# Patient Record
Sex: MALE | Race: WHITE | URBAN - METROPOLITAN AREA
[De-identification: names, ages, dates, MRNs, and addresses within clinical notes are randomized per-mention and may not be internally consistent; named-entity substitution may affect disease eponyms.]

---

## 2017-09-27 ENCOUNTER — TELEPHONE (OUTPATIENT)
Dept: ADMINISTRATIVE | Age: 82
End: 2017-09-27

## 2017-09-29 ENCOUNTER — TELEPHONE (OUTPATIENT)
Dept: ADMINISTRATIVE | Age: 82
End: 2017-09-29

## 2019-02-06 PROBLEM — I51.7 ATRIAL DILATATION, BILATERAL: Status: ACTIVE | Noted: 2019-02-06

## 2019-02-06 PROBLEM — I77.810 AORTIC ROOT DILATATION (HCC): Status: ACTIVE | Noted: 2019-02-06

## 2019-05-17 PROBLEM — Z79.02 ENCOUNTER FOR CURRENT LONG TERM USE OF ANTIPLATELET DRUG: Status: ACTIVE | Noted: 2019-05-17

## 2020-06-30 PROBLEM — I45.10 RBBB: Status: ACTIVE | Noted: 2020-06-30

## 2020-06-30 PROBLEM — R93.1 ABNORMAL ECHOCARDIOGRAM: Status: ACTIVE | Noted: 2020-06-30

## 2020-06-30 PROBLEM — R94.31 ABNORMAL ECG: Status: ACTIVE | Noted: 2020-06-30

## 2023-09-10 ENCOUNTER — HOSPITAL ENCOUNTER (INPATIENT)
Age: 88
LOS: 4 days | Discharge: NURSING, REHAB OR LONG-TERM CARE | End: 2023-09-14
Attending: EMERGENCY MEDICINE | Admitting: STUDENT IN AN ORGANIZED HEALTH CARE EDUCATION/TRAINING PROGRAM
Payer: OTHER GOVERNMENT

## 2023-09-10 ENCOUNTER — APPOINTMENT (OUTPATIENT)
Dept: CT IMAGING | Age: 88
End: 2023-09-10
Payer: OTHER GOVERNMENT

## 2023-09-10 ENCOUNTER — APPOINTMENT (OUTPATIENT)
Dept: GENERAL RADIOLOGY | Age: 88
End: 2023-09-10
Payer: OTHER GOVERNMENT

## 2023-09-10 DIAGNOSIS — T79.6XXA TRAUMATIC RHABDOMYOLYSIS, INITIAL ENCOUNTER (HCC): ICD-10-CM
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
## 2023-09-10 PROBLEM — N30.00 ACUTE CYSTITIS WITHOUT HEMATURIA: Status: ACTIVE | Noted: 2023-09-10

## 2023-09-10 PROBLEM — M62.82 RHABDOMYOLYSIS: Status: ACTIVE | Noted: 2023-09-10

## 2023-09-10 LAB
ALBUMIN SERPL-MCNC: 3.7 G/DL (ref 3.5–5.2)
ALBUMIN/GLOB SERPL: 1.1 {RATIO} (ref 1–2.5)
ALP SERPL-CCNC: 91 U/L (ref 40–129)
ALT SERPL-CCNC: 17 U/L (ref 5–41)
AMMONIA PLAS-SCNC: <10 UMOL/L (ref 16–60)
ANION GAP SERPL CALCULATED.3IONS-SCNC: 11 MMOL/L (ref 9–17)
AST SERPL-CCNC: 36 U/L
BASOPHILS # BLD: 0 K/UL (ref 0–0.2)
BASOPHILS NFR BLD: 0 % (ref 0–2)
BILIRUB SERPL-MCNC: 1.1 MG/DL (ref 0.3–1.2)
BILIRUB UR QL STRIP: NEGATIVE
BUN SERPL-MCNC: 20 MG/DL (ref 8–23)
CALCIUM SERPL-MCNC: 9 MG/DL (ref 8.6–10.4)
CHLORIDE SERPL-SCNC: 102 MMOL/L (ref 98–107)
CK SERPL-CCNC: 834 U/L (ref 39–308)
CLARITY UR: CLEAR
CO2 SERPL-SCNC: 24 MMOL/L (ref 20–31)
COLOR UR: YELLOW
COMMENT: NORMAL
CREAT SERPL-MCNC: 0.8 MG/DL (ref 0.7–1.2)
EOSINOPHIL # BLD: 0 K/UL (ref 0–0.4)
EOSINOPHILS RELATIVE PERCENT: 0 % (ref 1–4)
ERYTHROCYTE [DISTWIDTH] IN BLOOD BY AUTOMATED COUNT: 15.2 % (ref 12.5–15.4)
GFR SERPL CREATININE-BSD FRML MDRD: >60 ML/MIN/1.73M2
GLUCOSE SERPL-MCNC: 108 MG/DL (ref 70–99)
GLUCOSE UR STRIP-MCNC: NEGATIVE MG/DL
HCT VFR BLD AUTO: 42.3 % (ref 41–53)
HGB BLD-MCNC: 14.2 G/DL (ref 13.5–17.5)
HGB UR QL STRIP.AUTO: NEGATIVE
KETONES UR STRIP-MCNC: NEGATIVE MG/DL
LACTATE BLDV-SCNC: 2.1 MMOL/L (ref 0.5–2.2)
LEUKOCYTE ESTERASE UR QL STRIP: NEGATIVE
LIPASE SERPL-CCNC: 15 U/L (ref 13–60)
LYMPHOCYTES NFR BLD: 0.8 K/UL (ref 1–4.8)
LYMPHOCYTES RELATIVE PERCENT: 8 % (ref 24–44)
MCH RBC QN AUTO: 33.1 PG (ref 26–34)
MCHC RBC AUTO-ENTMCNC: 33.5 G/DL (ref 31–37)
MCV RBC AUTO: 98.9 FL (ref 80–100)
MONOCYTES NFR BLD: 0.5 K/UL (ref 0.1–1.2)
MONOCYTES NFR BLD: 5 % (ref 2–11)
MYOGLOBIN SERPL-MCNC: 676 NG/ML (ref 28–72)
NEUTROPHILS NFR BLD: 87 % (ref 36–66)
NEUTS SEG NFR BLD: 8.2 K/UL (ref 1.8–7.7)
NITRITE UR QL STRIP: NEGATIVE
PH UR STRIP: 5.5 [PH] (ref 5–8)
PLATELET # BLD AUTO: 532 K/UL (ref 140–450)
PMV BLD AUTO: 7.3 FL (ref 6–12)
POTASSIUM SERPL-SCNC: 3.3 MMOL/L (ref 3.7–5.3)
PROT SERPL-MCNC: 7.2 G/DL (ref 6.4–8.3)
PROT UR STRIP-MCNC: NEGATIVE MG/DL
RBC # BLD AUTO: 4.28 M/UL (ref 4.5–5.9)
SODIUM SERPL-SCNC: 137 MMOL/L (ref 135–144)
SP GR UR STRIP: 1.02 (ref 1–1.03)
TROPONIN I SERPL HS-MCNC: 30 NG/L (ref 0–22)
TROPONIN I SERPL HS-MCNC: 33 NG/L (ref 0–22)
TROPONIN I SERPL HS-MCNC: 33 NG/L (ref 0–22)
TSH SERPL DL<=0.05 MIU/L-ACNC: 2.28 UIU/ML (ref 0.3–5)
UROBILINOGEN UR STRIP-ACNC: NORMAL EU/DL (ref 0–1)
WBC OTHER # BLD: 9.5 K/UL (ref 3.5–11)

## 2023-09-10 PROCEDURE — 84484 ASSAY OF TROPONIN QUANT: CPT

## 2023-09-10 PROCEDURE — 1200000000 HC SEMI PRIVATE

## 2023-09-10 PROCEDURE — 83605 ASSAY OF LACTIC ACID: CPT

## 2023-09-10 PROCEDURE — 6370000000 HC RX 637 (ALT 250 FOR IP): Performed by: PHYSICIAN ASSISTANT

## 2023-09-10 PROCEDURE — 84443 ASSAY THYROID STIM HORMONE: CPT

## 2023-09-10 PROCEDURE — 99222 1ST HOSP IP/OBS MODERATE 55: CPT | Performed by: STUDENT IN AN ORGANIZED HEALTH CARE EDUCATION/TRAINING PROGRAM

## 2023-09-10 PROCEDURE — 81003 URINALYSIS AUTO W/O SCOPE: CPT

## 2023-09-10 PROCEDURE — 85025 COMPLETE CBC W/AUTO DIFF WBC: CPT

## 2023-09-10 PROCEDURE — 87205 SMEAR GRAM STAIN: CPT

## 2023-09-10 PROCEDURE — 36415 COLL VENOUS BLD VENIPUNCTURE: CPT

## 2023-09-10 PROCEDURE — 93005 ELECTROCARDIOGRAM TRACING: CPT | Performed by: EMERGENCY MEDICINE

## 2023-09-10 PROCEDURE — 87040 BLOOD CULTURE FOR BACTERIA: CPT

## 2023-09-10 PROCEDURE — 99285 EMERGENCY DEPT VISIT HI MDM: CPT

## 2023-09-10 PROCEDURE — 93005 ELECTROCARDIOGRAM TRACING: CPT | Performed by: PHYSICIAN ASSISTANT

## 2023-09-10 PROCEDURE — 72125 CT NECK SPINE W/O DYE: CPT

## 2023-09-10 PROCEDURE — 6370000000 HC RX 637 (ALT 250 FOR IP): Performed by: NURSE PRACTITIONER

## 2023-09-10 PROCEDURE — 80053 COMPREHEN METABOLIC PANEL: CPT

## 2023-09-10 PROCEDURE — 71046 X-RAY EXAM CHEST 2 VIEWS: CPT

## 2023-09-10 PROCEDURE — 2580000003 HC RX 258: Performed by: PHYSICIAN ASSISTANT

## 2023-09-10 PROCEDURE — 87086 URINE CULTURE/COLONY COUNT: CPT

## 2023-09-10 PROCEDURE — 70450 CT HEAD/BRAIN W/O DYE: CPT

## 2023-09-10 PROCEDURE — 6360000002 HC RX W HCPCS: Performed by: STUDENT IN AN ORGANIZED HEALTH CARE EDUCATION/TRAINING PROGRAM

## 2023-09-10 PROCEDURE — 82550 ASSAY OF CK (CPK): CPT

## 2023-09-10 PROCEDURE — 83874 ASSAY OF MYOGLOBIN: CPT

## 2023-09-10 PROCEDURE — 2580000003 HC RX 258: Performed by: STUDENT IN AN ORGANIZED HEALTH CARE EDUCATION/TRAINING PROGRAM

## 2023-09-10 PROCEDURE — 83690 ASSAY OF LIPASE: CPT

## 2023-09-10 PROCEDURE — 73522 X-RAY EXAM HIPS BI 3-4 VIEWS: CPT

## 2023-09-10 PROCEDURE — 82140 ASSAY OF AMMONIA: CPT

## 2023-09-10 RX ORDER — DOXYCYCLINE HYCLATE 100 MG/1
100 CAPSULE ORAL 2 TIMES DAILY
Qty: 20 CAPSULE | Refills: 0 | Status: ON HOLD | COMMUNITY
Start: 2023-09-04 | End: 2023-09-13 | Stop reason: HOSPADM

## 2023-09-10 RX ORDER — LOSARTAN POTASSIUM 50 MG/1
100 TABLET ORAL DAILY
Status: DISCONTINUED | OUTPATIENT
Start: 2023-09-11 | End: 2023-09-11

## 2023-09-10 RX ORDER — SODIUM CHLORIDE 9 MG/ML
INJECTION, SOLUTION INTRAVENOUS CONTINUOUS
Status: DISCONTINUED | OUTPATIENT
Start: 2023-09-10 | End: 2023-09-12

## 2023-09-10 RX ORDER — ACETAMINOPHEN 325 MG/1
650 TABLET ORAL EVERY 6 HOURS PRN
Status: DISCONTINUED | OUTPATIENT
Start: 2023-09-10 | End: 2023-09-14 | Stop reason: HOSPADM

## 2023-09-10 RX ORDER — ENOXAPARIN SODIUM 100 MG/ML
40 INJECTION SUBCUTANEOUS DAILY
Status: DISCONTINUED | OUTPATIENT
Start: 2023-09-10 | End: 2023-09-14 | Stop reason: HOSPADM

## 2023-09-10 RX ORDER — ACETAMINOPHEN 650 MG/1
650 SUPPOSITORY RECTAL EVERY 6 HOURS PRN
Status: DISCONTINUED | OUTPATIENT
Start: 2023-09-10 | End: 2023-09-14 | Stop reason: HOSPADM

## 2023-09-10 RX ORDER — PRAVASTATIN SODIUM 20 MG
40 TABLET ORAL DAILY
Status: DISCONTINUED | OUTPATIENT
Start: 2023-09-11 | End: 2023-09-14 | Stop reason: HOSPADM

## 2023-09-10 RX ORDER — SODIUM CHLORIDE 0.9 % (FLUSH) 0.9 %
5-40 SYRINGE (ML) INJECTION EVERY 12 HOURS SCHEDULED
Status: DISCONTINUED | OUTPATIENT
Start: 2023-09-10 | End: 2023-09-14 | Stop reason: HOSPADM

## 2023-09-10 RX ORDER — CLOPIDOGREL BISULFATE 75 MG/1
75 TABLET ORAL DAILY
Status: DISCONTINUED | OUTPATIENT
Start: 2023-09-11 | End: 2023-09-14 | Stop reason: HOSPADM

## 2023-09-10 RX ORDER — ONDANSETRON 4 MG/1
4 TABLET, ORALLY DISINTEGRATING ORAL EVERY 8 HOURS PRN
Status: DISCONTINUED | OUTPATIENT
Start: 2023-09-10 | End: 2023-09-14 | Stop reason: HOSPADM

## 2023-09-10 RX ORDER — QUETIAPINE FUMARATE 25 MG/1
25 TABLET, FILM COATED ORAL ONCE
Status: COMPLETED | OUTPATIENT
Start: 2023-09-10 | End: 2023-09-10

## 2023-09-10 RX ORDER — LIDOCAINE HYDROCHLORIDE 20 MG/ML
JELLY TOPICAL ONCE
Status: COMPLETED | OUTPATIENT
Start: 2023-09-10 | End: 2023-09-10

## 2023-09-10 RX ORDER — 0.9 % SODIUM CHLORIDE 0.9 %
1000 INTRAVENOUS SOLUTION INTRAVENOUS ONCE
Status: COMPLETED | OUTPATIENT
Start: 2023-09-10 | End: 2023-09-10

## 2023-09-10 RX ORDER — DOXYCYCLINE HYCLATE 100 MG
100 TABLET ORAL 2 TIMES DAILY
Status: DISCONTINUED | OUTPATIENT
Start: 2023-09-10 | End: 2023-09-13

## 2023-09-10 RX ORDER — SODIUM CHLORIDE 9 MG/ML
INJECTION, SOLUTION INTRAVENOUS PRN
Status: DISCONTINUED | OUTPATIENT
Start: 2023-09-10 | End: 2023-09-14 | Stop reason: HOSPADM

## 2023-09-10 RX ORDER — ONDANSETRON 2 MG/ML
4 INJECTION INTRAMUSCULAR; INTRAVENOUS EVERY 6 HOURS PRN
Status: DISCONTINUED | OUTPATIENT
Start: 2023-09-10 | End: 2023-09-14 | Stop reason: HOSPADM

## 2023-09-10 RX ORDER — SODIUM CHLORIDE 0.9 % (FLUSH) 0.9 %
5-40 SYRINGE (ML) INJECTION PRN
Status: DISCONTINUED | OUTPATIENT
Start: 2023-09-10 | End: 2023-09-14 | Stop reason: HOSPADM

## 2023-09-10 RX ORDER — POLYETHYLENE GLYCOL 3350 17 G/17G
17 POWDER, FOR SOLUTION ORAL DAILY PRN
Status: DISCONTINUED | OUTPATIENT
Start: 2023-09-10 | End: 2023-09-14 | Stop reason: HOSPADM

## 2023-09-10 RX ORDER — ASPIRIN 81 MG/1
81 TABLET ORAL DAILY
Status: DISCONTINUED | OUTPATIENT
Start: 2023-09-11 | End: 2023-09-14 | Stop reason: HOSPADM

## 2023-09-10 RX ADMIN — SODIUM CHLORIDE 1000 ML: 9 INJECTION, SOLUTION INTRAVENOUS at 13:30

## 2023-09-10 RX ADMIN — QUETIAPINE FUMARATE 25 MG: 25 TABLET ORAL at 22:24

## 2023-09-10 RX ADMIN — LIDOCAINE HYDROCHLORIDE: 20 JELLY TOPICAL at 13:44

## 2023-09-10 RX ADMIN — SODIUM CHLORIDE: 9 INJECTION, SOLUTION INTRAVENOUS at 22:30

## 2023-09-10 RX ADMIN — SODIUM CHLORIDE, PRESERVATIVE FREE 10 ML: 5 INJECTION INTRAVENOUS at 21:00

## 2023-09-10 RX ADMIN — CEFTRIAXONE SODIUM 1000 MG: 1 INJECTION, POWDER, FOR SOLUTION INTRAMUSCULAR; INTRAVENOUS at 22:30

## 2023-09-10 ASSESSMENT — PAIN - FUNCTIONAL ASSESSMENT: PAIN_FUNCTIONAL_ASSESSMENT: NONE - DENIES PAIN

## 2023-09-10 ASSESSMENT — ENCOUNTER SYMPTOMS: VOMITING: 0

## 2023-09-10 NOTE — H&P
[facility]  Office: [phone]  Eastman DO Pasha, Agustina Gomes DO, Gilbert Fuentes DO, Toña Stratton DO, Isaac Jorge MD, Yudelka Driscoll MD, Yefri Winn MD, Cornell Sky MD,  Carine Guardado MD, Keanu Huang MD, Cherelle Campa DO, Cloyde Carrel, MD,  Mai Ch MD, Terri Berg MD, Cassie Silvestre DO, Christal Henderson MD,  Arlene Moore MD, Ronni Gutiérrez MD, Cuca Ruth MD, Mony Mcgovern MD,  Austin Koroma MD, Rohit Veronica MD, Salina Carias MD, Janis Severin, MD, Kimi Gooden DO, Pacheco Schuler MD,  Abraham Cobb MD, Jania Moraes MD, Joss Abarca, LUX,  Arianne Rojas, CNP,, Jennifer Osman CNP,  [facility], Zhen Burnett, CNP, Shikha Donald, CNP, Christos Harrington, CNP, Dante Mg, CNP, Kalyani Figueroa, CNP, Nicole Medel, CNP, Asad Yates, CNS, Yosef Amaya, [facility], [address]    HISTORY AND PHYSICAL EXAMINATION            Date:   9/10/2023  Patient name:  Hollie Hammans  Date of admission:  9/10/2023 12:28 PM  MRN:   [id number]  Account:  [de-identified]  YOB: 1934  PCP:    No primary care provider on file. Room:   1TRAUMA/1TRAUMA  Code Status:    No Order      History Obtained From:     patient    History of Present Illness:     Hollie Hammans is an 80-year-old male with a past medical history of hypertension and hyperlipidemia who presented to the emergency department on 9/10/2023 complaining of confusion and frequent falls at home. History is obtained from family who state that he has been acting confused over the past week and has been falling frequently. The patient states that he fell out of bed early this morning and was unable to get himself off of the floor. The patient was down for at least six-hours per family. In the ED, the patient was afebrile and nontoxic appearing.  He is very hard of hearing but answers six-hours per family   -Maintenance fluids at 76 mL/hr   -Daily CK     Acute cystitis without hematuria   -Urinalysis is falsely negative as the patient has been on doxycycline   -Ceftriaxone 1 gram q24h   -Urine culture pending     Acute metabolic encephalopathy   -Likely secondary to a combination of urinary tract infection as well as underlying dementia   -Ceftriaxone as above     Urinary retention   -Likely secondary to UTI versus BPH   -Mariee catheter in place   -Voiding trial prior to discharge     Hypertension   -Continue home losartan    Hyperlipidemia   -Continue home pravastatin     Physical debility   -PT/OT   -Patient will likely require placement     Advanced care planning   -Goals of care were discussed with family at bedside   -Family state that the patient would not wish to be resuscitated in the event of a cardiac or respiratory arrest and would not want to be intubated   -Code status is DNR-CCA with no intubation       Patient is admitted as inpatient status because of co-morbidities listed above, severity of signs and symptoms as outlined, requirement for current medical therapies and most importantly because of direct risk to patient if care not provided in a hospital setting. Expected length of stay > 48 hours. Patient is admitted in the Med/Surge      Michele Campos MD  9/10/2023  5:21 PM    Copy sent to Dr. Cross primary care provider on file.

## 2023-09-10 NOTE — ED PROVIDER NOTES
Emergency Department     Faculty Attestation    I performed a history and physical examination of the patient and discussed management with the mid level provider. I reviewed the mid level provider's note and agree with the documented findings and plan of care. Any areas of disagreement are noted on the chart. I was personally present for the key portions of any procedures. I have documented in the chart those procedures where I was not present during the key portions. I have reviewed the emergency nurses triage note. I agree with the chief complaint, past medical history, past surgical history, allergies, medications, social and family history as documented unless otherwise noted below. Documentation of the HPI, Physical Exam and Medical Decision Making performed by medical students or scribes is based on my personal performance of the HPI, PE and MDM. For Physician Assistant/ Nurse Practitioner cases/documentation I have personally evaluated this patient and have completed at least one if not all key elements of the E/M (history, physical exam, and MDM). Additional findings are as noted. Primary Care Physician:  No primary care provider on file.     CHIEF COMPLAINT       Chief Complaint   Patient presents with    Fall     Ambulance called out today due to a fall, states he slid down the bed (controlled fall), did not hit his head, but family states he has been more altered since his last fall on Wednesday 9/6/23       RECENT VITALS:    ,   , Respirations: 15, BP: 106/63    LABS:  Labs Reviewed   CULTURE, BLOOD 1   CULTURE, BLOOD 2   CBC WITH AUTO DIFFERENTIAL   COMPREHENSIVE METABOLIC PANEL   LIPASE   TROPONIN   TSH   LACTIC ACID   URINALYSIS WITH REFLEX TO CULTURE     G shows sinus rhythm with PACs rate of 82 bpm IN was 146 ms castration 96 ms QT corrected 457 ms axis is -17 there is no acute ST or T wave changes seen otherwise normal    PERTINENT ATTENDING PHYSICIAN COMMENTS:    There was gradual decline in mental status last week he has had at least 1 fall he complains of some weakness in his legs and difficulty ambulating he is got no focal neurologic deficits he is very deaf but no other deficits noted we will do work-up to rule out dehydration infection any injury from the fall         Apoorva Pérez MD  09/10/23 100 Connie Pham MD  09/10/23 [id number]

## 2023-09-10 NOTE — ED PROVIDER NOTES
[address] [facility]  eMERGENCY dEPARTMENT eNCOUnter      Pt Name: Gladis Zarco  MRN: [id number]  [address] [facility] [place] 1934  Date of evaluation: 9/10/23      CHIEF COMPLAINT       Chief Complaint   Patient presents with    Fall     Ambulance called out today due to a fall, states he slid down the bed (controlled fall), did not hit his head, but family states he has been more altered since his last fall on Wednesday 9/6/23         HISTORY OF PRESENT ILLNESS    Gladis Zarco is a 80 y.o. male who presents complaining of he fell he slipped down off of the bed elevated left leg was working quite right. Family is here with him and they report that he had some increased confusion that has been ongoing gradually but has had a couple falls in the past week. He does have an abscess on his back that he is taking doxycycline for. He denies any chest pain shortness of breath denies any abdominal pain or vomiting. Therefore he has been urinating quite frequently but only small amount at a time. Fall  Incident onset: Family reports increased confusion and increased falls he is fallen twice in the past week he has an abscess on the back has been treated for doxycycline not taking his medications as prescribed anything below but more confused. Fall occurred: Anxiety got caught up and while getting out of the shower went down to the ground today he went to get up out of bed and his left leg gave out. He landed on [place]. Point of impact: No impact point but he slipped down to the floor denies injury. The patient is experiencing no pain. There was No entrapment after the fall. There was No drug use involved in the accident. There was No alcohol use involved in the accident. Pertinent negatives include no fever, no numbness, no vomiting, no headaches, no hearing loss, no loss of consciousness and no tingling. He has tried nothing for the symptoms.        REVIEW OF SYSTEMS       Review of Systems

## 2023-09-11 PROBLEM — R33.9 URINARY RETENTION: Status: ACTIVE | Noted: 2023-09-11

## 2023-09-11 PROBLEM — R31.0 GROSS HEMATURIA: Status: ACTIVE | Noted: 2023-09-11

## 2023-09-11 LAB
CK SERPL-CCNC: 629 U/L (ref 39–308)
EKG ATRIAL RATE: 79 BPM
EKG ATRIAL RATE: 82 BPM
EKG P AXIS: 47 DEGREES
EKG P AXIS: 49 DEGREES
EKG P-R INTERVAL: 144 MS
EKG P-R INTERVAL: 146 MS
EKG Q-T INTERVAL: 392 MS
EKG Q-T INTERVAL: 410 MS
EKG QRS DURATION: 88 MS
EKG QRS DURATION: 96 MS
EKG QTC CALCULATION (BAZETT): 457 MS
EKG QTC CALCULATION (BAZETT): 470 MS
EKG R AXIS: -17 DEGREES
EKG R AXIS: -25 DEGREES
EKG T AXIS: 28 DEGREES
EKG T AXIS: 39 DEGREES
EKG VENTRICULAR RATE: 79 BPM
EKG VENTRICULAR RATE: 82 BPM
MICROORGANISM SPEC CULT: NO GROWTH
SPECIMEN DESCRIPTION: NORMAL
TROPONIN I SERPL HS-MCNC: 35 NG/L (ref 0–22)
TROPONIN I SERPL HS-MCNC: 39 NG/L (ref 0–22)

## 2023-09-11 PROCEDURE — 99232 SBSQ HOSP IP/OBS MODERATE 35: CPT | Performed by: STUDENT IN AN ORGANIZED HEALTH CARE EDUCATION/TRAINING PROGRAM

## 2023-09-11 PROCEDURE — 97535 SELF CARE MNGMENT TRAINING: CPT

## 2023-09-11 PROCEDURE — 36415 COLL VENOUS BLD VENIPUNCTURE: CPT

## 2023-09-11 PROCEDURE — 97166 OT EVAL MOD COMPLEX 45 MIN: CPT

## 2023-09-11 PROCEDURE — 99222 1ST HOSP IP/OBS MODERATE 55: CPT | Performed by: SPECIALIST

## 2023-09-11 PROCEDURE — 2580000003 HC RX 258: Performed by: STUDENT IN AN ORGANIZED HEALTH CARE EDUCATION/TRAINING PROGRAM

## 2023-09-11 PROCEDURE — 6370000000 HC RX 637 (ALT 250 FOR IP): Performed by: STUDENT IN AN ORGANIZED HEALTH CARE EDUCATION/TRAINING PROGRAM

## 2023-09-11 PROCEDURE — 84484 ASSAY OF TROPONIN QUANT: CPT

## 2023-09-11 PROCEDURE — 1200000000 HC SEMI PRIVATE

## 2023-09-11 PROCEDURE — 82550 ASSAY OF CK (CPK): CPT

## 2023-09-11 PROCEDURE — 6360000002 HC RX W HCPCS: Performed by: STUDENT IN AN ORGANIZED HEALTH CARE EDUCATION/TRAINING PROGRAM

## 2023-09-11 PROCEDURE — 97162 PT EVAL MOD COMPLEX 30 MIN: CPT

## 2023-09-11 PROCEDURE — 97116 GAIT TRAINING THERAPY: CPT

## 2023-09-11 RX ORDER — POTASSIUM CHLORIDE 20 MEQ/1
40 TABLET, EXTENDED RELEASE ORAL ONCE
Status: COMPLETED | OUTPATIENT
Start: 2023-09-11 | End: 2023-09-11

## 2023-09-11 RX ORDER — QUETIAPINE FUMARATE 25 MG/1
50 TABLET, FILM COATED ORAL NIGHTLY
Status: DISCONTINUED | OUTPATIENT
Start: 2023-09-11 | End: 2023-09-14 | Stop reason: HOSPADM

## 2023-09-11 RX ADMIN — QUETIAPINE FUMARATE 50 MG: 25 TABLET ORAL at 20:46

## 2023-09-11 RX ADMIN — POTASSIUM CHLORIDE 40 MEQ: 1500 TABLET, EXTENDED RELEASE ORAL at 19:05

## 2023-09-11 RX ADMIN — ASPIRIN 81 MG: 81 TABLET, COATED ORAL at 08:54

## 2023-09-11 RX ADMIN — ACETAMINOPHEN 650 MG: 325 TABLET ORAL at 08:54

## 2023-09-11 RX ADMIN — SODIUM CHLORIDE: 9 INJECTION, SOLUTION INTRAVENOUS at 18:21

## 2023-09-11 RX ADMIN — PRAVASTATIN SODIUM 40 MG: 20 TABLET ORAL at 08:54

## 2023-09-11 RX ADMIN — CEFTRIAXONE SODIUM 1000 MG: 1 INJECTION, POWDER, FOR SOLUTION INTRAMUSCULAR; INTRAVENOUS at 21:01

## 2023-09-11 RX ADMIN — CLOPIDOGREL BISULFATE 75 MG: 75 TABLET ORAL at 08:55

## 2023-09-11 RX ADMIN — ENOXAPARIN SODIUM 40 MG: 100 INJECTION SUBCUTANEOUS at 08:55

## 2023-09-11 RX ADMIN — DOXYCYCLINE HYCLATE 100 MG: 100 TABLET, COATED ORAL at 08:54

## 2023-09-11 RX ADMIN — DOXYCYCLINE HYCLATE 100 MG: 100 TABLET, COATED ORAL at 20:46

## 2023-09-11 RX ADMIN — SODIUM CHLORIDE: 9 INJECTION, SOLUTION INTRAVENOUS at 09:07

## 2023-09-11 NOTE — PROGRESS NOTES
Physical Therapy  Facility/Department: [facility] PROGRESSIVE CARE  Physical Therapy Initial Assessment    Name: Sita Talavera  : 1934  MRN: [id number]  Date of Service: 2023    Chief Complaint   Patient presents with    Fall     Ambulance called out today due to a fall, states he slid down the bed (controlled fall), did not hit his head, but family states he has been more altered since his last fall on 23      Discharge Recommendations:  Patient would benefit from continued therapy after discharge          Patient Diagnosis(es): The primary encounter diagnosis was Fall, initial encounter. A diagnosis of Traumatic rhabdomyolysis, initial encounter [facility]) was also pertinent to this visit. Past Medical History:  has a past medical history of Arthritis, Arthropathy, unspecified, site unspecified, Coronary atherosclerosis of unspecified type of vessel, native or graft, Other and unspecified hyperlipidemia, and Unspecified essential hypertension. Past Surgical History:  has a past surgical history that includes Knee arthroscopy; Coronary angioplasty with stent; Total hip arthroplasty; and Total shoulder arthroplasty (Right). Assessment   Body Structures, Functions, Activity Limitations Requiring Skilled Therapeutic Intervention: Decreased functional mobility ; Decreased endurance;Decreased safe awareness;Decreased strength;Decreased balance;Decreased posture    Assessment: Pt presents from home with 2 falls x 1 week, increase confusion. Pt requiring SBA for bed mobility, min assist for stand to sit and gait 14ft with rolling walker. Pt oriented to self only. Pt currently not appropriate or safe for return to prior living environment. Pt will benefit from continued PT for strengthening, orientation, balance and functional mobility training while in the hospital and at discharge.     Therapy Prognosis: Good    Decision Making: Medium Complexity    Requires PT Follow-Up: Yes  Activity FREE:[LAST:[[facility]],PHONE:[phone]],FAX:[(   )    -]] FREE:[LAST:[[facility]],PHONE:[phone]],FAX:[(   )    -]],TOKEN:'[id number]'

## 2023-09-11 NOTE — CARE COORDINATION
Case Management Assessment  Initial Evaluation    Date/Time of Evaluation: 9/11/2023 6:17 PM  Assessment Completed by: Trent Yen RN    If patient is discharged prior to next notation, then this note serves as note for discharge by case management. Patient Name: Rob Smallwood                   YOB: 1934  Diagnosis: Rhabdomyolysis [M62.82]  Fall, initial encounter [W19. XXXA]  Traumatic rhabdomyolysis, initial encounter ([address]) Hernan Levi. 6XXA]                   Date / Time: 9/10/2023 12:28 PM    Patient Admission Status: Inpatient   Readmission Risk (Low < 19, Mod (19-27), High > 27): No data recorded  Current PCP: No primary care provider on file. PCP verified by CM? No    Chart Reviewed: Yes      History Provided by: Patient  Patient Orientation: Person, Self    Patient Cognition: Alert    Hospitalization in the last 30 days (Readmission):  No    If yes, Readmission Assessment in CM Navigator will be completed. Advance Directives:      Code Status: DNR-CCA   Patient's Primary Decision Maker is:        Discharge Planning:    Patient lives with: Children, Family Members Type of Home: House  Primary Care Giver: Family  Patient Support Systems include: Children, Family Members   Current Financial resources: Medicare  Current community resources:    [facility] services prior to admission: [facility]            Current DME:              Type of Home Care services:  None    ADLS  Prior functional level: Assistance with the following:, Cooking, Housework, Shopping  Current functional level: Assistance with the following:, Cooking, Housework, Shopping    PT AM-PAC: 17 /24  OT AM-PAC: 19 /24    Family can provide assistance at DC: Yes  Would you like Case Management to discuss the discharge plan with any other family members/significant others, and if so, who?     Plans to Return to Present Housing: No  Other Identified Issues/Barriers to RETURNING to current housing: none  Potential Assistance needed at discharge: Name:       The Patient and/or Patient Representative Agree with the Discharge Plan?  Yes    Tesfaye Chapman, RN  Case Management Department  Ph:  Fax:

## 2023-09-11 NOTE — PLAN OF CARE
Problem: Discharge Planning  Goal: Discharge to home or other facility with appropriate resources  Outcome: Progressing  Flowsheets (Taken 9/10/2023 1903)  Discharge to home or other facility with appropriate resources: Identify barriers to discharge with patient and caregiver     Problem: Safety - Adult  Goal: Free from fall injury  Outcome: Progressing

## 2023-09-11 NOTE — CARE COORDINATION
[address], [address] [facility]        Information contained in this transmission is for the sole use of the intended recipient(s) and may contain confidential and privileged information. Any  unauthorized review, use, disclosure or distribution is prohibited. If you are not the intended recipient, please contact the sender for further  instructions regarding the information.         /: Otoniel Uriostegui RN  Phone Number: [phone]  [facility], [place]

## 2023-09-11 NOTE — PROGRESS NOTES
Occupational Therapy  Facility/Department: [facility]  Occupational Therapy Initial Assessment    Name: Minerva Calero  : 1934  MRN: [id number]  Date of Service: 2023  Chief Complaint   Patient presents with    Fall     Ambulance called out today due to a fall, states he slid down the bed (controlled fall), did not hit his head, but family states he has been more altered since his last fall on 23         Discharge Recommendations:  Patient would benefit from continued therapy after discharge  OT Equipment Recommendations  Other: TBD       Patient Diagnosis(es): The primary encounter diagnosis was Fall, initial encounter. A diagnosis of Traumatic rhabdomyolysis, initial encounter [facility]) was also pertinent to this visit. Past Medical History:  has a past medical history of Arthritis, Arthropathy, unspecified, site unspecified, Coronary atherosclerosis of unspecified type of vessel, native or graft, Other and unspecified hyperlipidemia, and Unspecified essential hypertension. Past Surgical History:  has a past surgical history that includes Knee arthroscopy; Coronary angioplasty with stent; Total hip arthroplasty; and Total shoulder arthroplasty (Right). Assessment   Performance deficits / Impairments: Decreased functional mobility ; Decreased safe awareness;Decreased balance;Decreased ADL status; Decreased cognition;Decreased strength  Assessment: : Pt presents from home with 2 falls x 1 week, increase confusion. Pt requiring SBA for bed mobility, min assist for stand to sit and gait 14ft with rolling walker min Assist with LB ADLs due to balance and CGA to min assist with UB ADLs. Pt oriented to self only. Pt currently not appropriate or safe for return to prior living environment. Pt will benefit from continued OT for strengthening, orientation, balance and functional mobility training and ADLs while in the hospital and at discharge.   Prognosis: Fair  Decision Minutes 25         Timed Code Treatment Minutes: [facility], OT

## 2023-09-12 LAB
ANION GAP SERPL CALCULATED.3IONS-SCNC: 5 MMOL/L (ref 9–17)
BUN SERPL-MCNC: 20 MG/DL (ref 8–23)
CALCIUM SERPL-MCNC: 8.1 MG/DL (ref 8.6–10.4)
CHLORIDE SERPL-SCNC: 109 MMOL/L (ref 98–107)
CO2 SERPL-SCNC: 25 MMOL/L (ref 20–31)
CREAT SERPL-MCNC: 0.8 MG/DL (ref 0.7–1.2)
GFR SERPL CREATININE-BSD FRML MDRD: >60 ML/MIN/1.73M2
GLUCOSE SERPL-MCNC: 95 MG/DL (ref 70–99)
POTASSIUM SERPL-SCNC: 4 MMOL/L (ref 3.7–5.3)
SODIUM SERPL-SCNC: 139 MMOL/L (ref 135–144)

## 2023-09-12 PROCEDURE — 6360000002 HC RX W HCPCS: Performed by: STUDENT IN AN ORGANIZED HEALTH CARE EDUCATION/TRAINING PROGRAM

## 2023-09-12 PROCEDURE — 97116 GAIT TRAINING THERAPY: CPT

## 2023-09-12 PROCEDURE — 99232 SBSQ HOSP IP/OBS MODERATE 35: CPT | Performed by: HOSPITALIST

## 2023-09-12 PROCEDURE — 2580000003 HC RX 258: Performed by: STUDENT IN AN ORGANIZED HEALTH CARE EDUCATION/TRAINING PROGRAM

## 2023-09-12 PROCEDURE — 1200000000 HC SEMI PRIVATE

## 2023-09-12 PROCEDURE — 6370000000 HC RX 637 (ALT 250 FOR IP): Performed by: HOSPITALIST

## 2023-09-12 PROCEDURE — 97110 THERAPEUTIC EXERCISES: CPT

## 2023-09-12 PROCEDURE — 6370000000 HC RX 637 (ALT 250 FOR IP): Performed by: STUDENT IN AN ORGANIZED HEALTH CARE EDUCATION/TRAINING PROGRAM

## 2023-09-12 PROCEDURE — 97530 THERAPEUTIC ACTIVITIES: CPT

## 2023-09-12 PROCEDURE — 36415 COLL VENOUS BLD VENIPUNCTURE: CPT

## 2023-09-12 PROCEDURE — 80048 BASIC METABOLIC PNL TOTAL CA: CPT

## 2023-09-12 RX ORDER — CLONAZEPAM 0.5 MG/1
0.5 TABLET ORAL NIGHTLY
Status: DISCONTINUED | OUTPATIENT
Start: 2023-09-12 | End: 2023-09-14 | Stop reason: HOSPADM

## 2023-09-12 RX ADMIN — ASPIRIN 81 MG: 81 TABLET, COATED ORAL at 09:58

## 2023-09-12 RX ADMIN — CLONAZEPAM 0.5 MG: 0.5 TABLET ORAL at 20:34

## 2023-09-12 RX ADMIN — DOXYCYCLINE HYCLATE 100 MG: 100 TABLET, COATED ORAL at 09:58

## 2023-09-12 RX ADMIN — SODIUM CHLORIDE, PRESERVATIVE FREE 10 ML: 5 INJECTION INTRAVENOUS at 20:34

## 2023-09-12 RX ADMIN — QUETIAPINE FUMARATE 50 MG: 25 TABLET ORAL at 20:34

## 2023-09-12 RX ADMIN — ENOXAPARIN SODIUM 40 MG: 100 INJECTION SUBCUTANEOUS at 10:00

## 2023-09-12 RX ADMIN — CLOPIDOGREL BISULFATE 75 MG: 75 TABLET ORAL at 09:58

## 2023-09-12 RX ADMIN — DOXYCYCLINE HYCLATE 100 MG: 100 TABLET, COATED ORAL at 20:38

## 2023-09-12 RX ADMIN — PRAVASTATIN SODIUM 40 MG: 20 TABLET ORAL at 09:58

## 2023-09-12 NOTE — PROGRESS NOTES
Physical Therapy  Facility/Department: [facility] PROGRESSIVE CARE  Physical Therapy Daily Treatment Note    Name: Tish Zhao  : 1934  MRN: [id number]  Date of Service: 2023    Discharge Recommendations:  Patient would benefit from continued therapy after discharge          Patient Diagnosis(es): The primary encounter diagnosis was Fall, initial encounter. A diagnosis of Traumatic rhabdomyolysis, initial encounter [facility]) was also pertinent to this visit. Past Medical History:  has a past medical history of Arthritis, Arthropathy, unspecified, site unspecified, Coronary atherosclerosis of unspecified type of vessel, native or graft, Other and unspecified hyperlipidemia, and Unspecified essential hypertension. Past Surgical History:  has a past surgical history that includes Knee arthroscopy; Coronary angioplasty with stent; Total hip arthroplasty; and Total shoulder arthroplasty (Right). Assessment   Body Structures, Functions, Activity Limitations Requiring Skilled Therapeutic Intervention: Decreased functional mobility ; Decreased endurance;Decreased safe awareness;Decreased strength;Decreased balance;Decreased posture  Assessment: Pt presents from home with 2 falls x 1 week, increase confusion. Pt requiring min to SBA for bed mobility, CGA for stand to sit and min to CGA for gait 25ft, 35ft with rolling walker. Pt oriented to self only. Pt currently not appropriate or safe for return to prior living environment. Pt will benefit from continued PT for strengthening, orientation, balance and functional mobility training while in the hospital and at discharge.   Therapy Prognosis: Good  Decision Making: Medium Complexity  Requires PT Follow-Up: Yes  Activity Tolerance  Activity Tolerance: Treatment limited secondary to decreased cognition  Activity Tolerance Comments: initially c/o dizziness, pt reported that this went away     Plan   Physcial Therapy Plan  General Plan: Other (See Comment) (5-6x/wk)  Current Treatment Recommendations: Strengthening, Balance training, Functional mobility training, Transfer training, Gait training, Cognitive reorientation, Therapeutic activities, Safety education & training  Safety Devices  Type of Devices: Gait belt, Call light within reach, Chair alarm in place, Left in chair, Patient at risk for falls, Nurse notified  Restraints  Restraints Initially in Place: No     Restrictions  Restrictions/Precautions  Restrictions/Precautions: Bed Alarm, Fall Risk  Required Braces or Orthoses?: No  Position Activity Restriction  Other position/activity restrictions: \"Up with assistance\", confusion, tall stature     Subjective   General  Patient assessed for rehabilitation services?: Yes  Additional Pertinent Hx: zeny ALEIDA's, right TSA  Family / Caregiver Present: Yes (daughter-in-law came towards end of tx session)  Follows Commands: Impaired  Subjective  Subjective: Pt denies pain; c/o dizziness initially upon standing but reported that this went away. Cognition   Orientation  Overall Orientation Status: Impaired  Orientation Level: Oriented to person  Cognition  Overall Cognitive Status: Exceptions  Arousal/Alertness: Delayed responses to stimuli  Following Commands: Follows one step commands with repetition; Follows one step commands with increased time  Attention Span: Difficulty attending to directions  Memory: Decreased short term memory;Decreased long term memory;Decreased recall of biographical Information;Decreased recall of precautions;Decreased recall of recent events  Safety Judgement: Decreased awareness of need for assistance;Decreased awareness of need for safety  Problem Solving: Decreased awareness of errors;Assistance required to identify errors made;Assistance required to generate solutions;Assistance required to implement solutions;Assistance required to correct errors made  Insights: Decreased awareness of deficits  Initiation: Requires cues for Goals  Short Term Goals  Time Frame for Short Term Goals: 14 visits  Short Term Goal 1: supine to and from sit independent  Short Term Goal 2: Sit to and from stand independent  Short Term Goal 3: Ambulate 60ft with rolling walker with supervision  Patient Goals   Patient Goals : none stated.  Pt confused       Education  Patient Education  Education Given To: Patient  Education Provided: Role of Therapy;Plan of Care;Transfer Training;Equipment  Education Provided Comments: Unable to educate patient due to confusion  Education Method: Verbal;Demonstration  Barriers to Learning: Cognition  Education Outcome: Continued education needed      Therapy Time   Individual Concurrent Group Co-treatment   Time In 1115         Time Out 1156         Minutes 41         Timed Code Treatment Minutes: [address], PT

## 2023-09-12 NOTE — PLAN OF CARE
Problem: Discharge Planning  Goal: Discharge to home or other facility with appropriate resources  9/12/2023 0149 by Fabiola Gaines RN  Outcome: Progressing  Flowsheets (Taken 9/11/2023 2000)  Discharge to home or other facility with appropriate resources: Identify barriers to discharge with patient and caregiver  9/11/2023 1839 by Lili Thomas RN  Outcome: Progressing     Problem: Safety - Adult  Goal: Free from fall injury  9/12/2023 0149 by Fabiola Gaines RN  Outcome: Progressing  9/11/2023 1839 by Lili Thomas RN  Outcome: Progressing     Problem: Skin/Tissue Integrity  Goal: Absence of new skin breakdown  Description: 1. Monitor for areas of redness and/or skin breakdown  2. Assess vascular access sites hourly  3. Every 4-6 hours minimum:  Change oxygen saturation probe site  4. Every 4-6 hours:  If on nasal continuous positive airway pressure, respiratory therapy assess nares and determine need for appliance change or resting period. 9/12/2023 0149 by Fabiola Gaines RN  Outcome: Progressing  9/11/2023 1839 by Lili Thomas RN  Outcome: Progressing     Problem: ABCDS Injury Assessment  Goal: Absence of physical injury  Outcome: Progressing     Problem: Confusion  Goal: Confusion, delirium, dementia, or psychosis is improved or at baseline  Description: INTERVENTIONS:  1. Assess for possible contributors to thought disturbance, including medications, impaired vision or hearing, underlying metabolic abnormalities, dehydration, psychiatric diagnoses, and notify attending LIP  2. [place] high risk fall precautions, as indicated  3. Provide frequent short contacts to provide reality reorientation, refocusing and direction  4. Decrease environmental stimuli, including noise as appropriate  5. Monitor and intervene to maintain adequate nutrition, hydration, elimination, sleep and activity  6.  If unable to ensure safety without constant attention obtain sitter and review sitter guidelines with assigned

## 2023-09-12 NOTE — PROGRESS NOTES
[facility]  Office: [phone]  Charla Ortega, DO, Raffaele Stern, DO, Ann Bishnu Blood, DO, Alyssa England MD, Jada Zaragoza MD, Cara Vela MD, Brad Ca MD,  Taras Lyle MD, John Mortensen MD, Gracie Watkins, DO, Yasmine Fields MD,  Travis Domínguez MD, Erica Peres MD, Carina Shin, DO, Jonnie Willson MD,  Hortencia Aiken MD, Alyssa Avila MD, Darnell Rios MD, Cadny Gomez MD,  Sharon Jacobs MD, Juvenal Marsh MD, Zainab Marina MD, Jeanne Cerda MD, Ji Richards DO, Josseline Lee MD,  Tiny Joens MD, Shaka Wheat MD, Kai Li, CNP,  Mis Estrella, CNP,, Arun Arenas, CNP,  Tashi Joel, [facility], Jose Eduardo Yuan, CNP, Makayla Ortiz, CNP, Bryant Diane CNP, Royal Bal, CNP, Josesito Roger, CNP, Santos Dawn, CNP, Re Hendrix, CNS, Evaristo Reid, CNP, Juan Rainey, [address] [place] [address]    Progress Note    9/12/2023    11:40 AM    Name:   Gladis Zarco  MRN:     [id number]     Acct:      [de-identified]   Room:   /[address] [place] [address] Day:  2  Admit Date:  9/10/2023 12:28 PM    PCP:   No primary care provider on file. Code Status:  DNR-CCA    Subjective:     Patient seen in follow-up for fall, acute metabolic encephalopathy secondary to acute cystitis with hematuria. Chief complaint unobtainable due to patient's confusion. Patient is awake and pleasantly confused. No major issues overnight. Unfortunately the Seroquel that he has been given has not been effective in helping him sleep. We will trial him on some Klonopin in addition to the Seroquel. Meanwhile the patient is maintained on antibiotics for his acute cystitis. Working on disposition to skilled nursing facility as patient is unable to care for himself. The patient as mentioned is pleasantly confused and oriented only to himself.   Furthermore conversation is difficult due to the 30* 33* 33* 35* 39*  --    CKTOTAL  --  834*  --  629*  --   --    MYOGLOBIN  --  676*  --   --   --   --      Recent Labs     09/10/23  1243 09/10/23  1612   PROT 7.2  --    LABALBU 3.7  --    TSH 2.28  --    AST 36  --    ALT 17  --    ALKPHOS 91  --    BILITOT 1.1  --    AMMONIA  --  <10*   LIPASE 15  --      ABG:No results found for: \"POCPH\", \"PHART\", \"PH\", \"POCPCO2\", \"VSF4XPZ\", \"PCO2\", \"POCPO2\", \"PO2ART\", \"PO2\", \"POCHCO3\", \"CCL3POY\", \"HCO3\", \"NBEA\", \"PBEA\", \"BEART\", \"BE\", \"THGBART\", \"THB\", \"XWR1KBC\", \"PIXI6GXB\", \"N0RUKNYI\", \"O2SAT\", \"FIO2\"  Lab Results   Component Value Date/Time    SPECIAL 3ML RIGHT HAND 09/10/2023 02:31 PM     Lab Results   Component Value Date/Time    CULTURE NO GROWTH 1 DAY 09/10/2023 02:31 PM       Radiology:  XR HIP 3-4 VW W PELVIS BILATERAL    Result Date: 9/10/2023  No prior study. No acute finding; no fracture or dislocation. Bilateral THR hardware. No obvious hardware failure. Mariee catheter. Additional findings, as above. XR CHEST (2 VW)    Result Date: 9/10/2023  No acute airspace disease identified. CT CSpine W/O Contrast    Result Date: 9/10/2023  CT HEAD: No CT evidence for acute intracranial abnormality. . Mild cerebral atrophy. CT CERVICAL SPINE: No acute fracture or subluxation of the cervical spine. Moderate degenerative changes. CT Head W/O Contrast    Result Date: 9/10/2023  CT HEAD: No CT evidence for acute intracranial abnormality. . Mild cerebral atrophy. CT CERVICAL SPINE: No acute fracture or subluxation of the cervical spine. Moderate degenerative changes.        Physical Examination:     General appearance:  alert, cooperative and no distress  Mental Status: Oriented to self  Lungs:  clear to auscultation bilaterally, normal effort  Heart:  regular rate and rhythm, no murmur  Abdomen:  soft, nontender, nondistended, normal bowel sounds, no masses, hepatomegaly, splenomegaly  Extremities:  no edema, redness, tenderness in the calves  Skin:  no gross

## 2023-09-13 LAB
MICROORGANISM SPEC CULT: ABNORMAL
SERVICE CMNT-IMP: ABNORMAL
SPECIMEN DESCRIPTION: ABNORMAL

## 2023-09-13 PROCEDURE — 2580000003 HC RX 258: Performed by: HOSPITALIST

## 2023-09-13 PROCEDURE — 2580000003 HC RX 258: Performed by: STUDENT IN AN ORGANIZED HEALTH CARE EDUCATION/TRAINING PROGRAM

## 2023-09-13 PROCEDURE — 97116 GAIT TRAINING THERAPY: CPT

## 2023-09-13 PROCEDURE — 97110 THERAPEUTIC EXERCISES: CPT

## 2023-09-13 PROCEDURE — 6360000002 HC RX W HCPCS: Performed by: HOSPITALIST

## 2023-09-13 PROCEDURE — 6370000000 HC RX 637 (ALT 250 FOR IP): Performed by: HOSPITALIST

## 2023-09-13 PROCEDURE — 1200000000 HC SEMI PRIVATE

## 2023-09-13 PROCEDURE — 6370000000 HC RX 637 (ALT 250 FOR IP): Performed by: STUDENT IN AN ORGANIZED HEALTH CARE EDUCATION/TRAINING PROGRAM

## 2023-09-13 PROCEDURE — 99232 SBSQ HOSP IP/OBS MODERATE 35: CPT | Performed by: HOSPITALIST

## 2023-09-13 RX ORDER — LEVOFLOXACIN 500 MG/1
500 TABLET, FILM COATED ORAL DAILY
Status: DISCONTINUED | OUTPATIENT
Start: 2023-09-13 | End: 2023-09-13

## 2023-09-13 RX ORDER — CLOPIDOGREL BISULFATE 75 MG/1
TABLET ORAL
Qty: 90 TABLET | Refills: 3 | Status: ON HOLD
Start: 2023-09-13

## 2023-09-13 RX ORDER — QUETIAPINE FUMARATE 50 MG/1
50 TABLET, FILM COATED ORAL NIGHTLY
Qty: 60 TABLET | Refills: 3 | Status: ON HOLD | OUTPATIENT
Start: 2023-09-13

## 2023-09-13 RX ADMIN — QUETIAPINE FUMARATE 50 MG: 25 TABLET ORAL at 20:59

## 2023-09-13 RX ADMIN — SODIUM CHLORIDE, PRESERVATIVE FREE 10 ML: 5 INJECTION INTRAVENOUS at 11:18

## 2023-09-13 RX ADMIN — SODIUM CHLORIDE, PRESERVATIVE FREE 10 ML: 5 INJECTION INTRAVENOUS at 20:59

## 2023-09-13 RX ADMIN — PIPERACILLIN AND TAZOBACTAM 4500 MG: 4; .5 INJECTION, POWDER, LYOPHILIZED, FOR SOLUTION INTRAVENOUS at 10:41

## 2023-09-13 RX ADMIN — PIPERACILLIN AND TAZOBACTAM 3375 MG: 3; .375 INJECTION, POWDER, LYOPHILIZED, FOR SOLUTION INTRAVENOUS at 15:56

## 2023-09-13 RX ADMIN — CLONAZEPAM 0.5 MG: 0.5 TABLET ORAL at 20:59

## 2023-09-13 NOTE — DISCHARGE SUMMARY
states he has been more altered since his last fall on Wednesday 9/6/23)    This very pleasant 80-year-old male hospital with fall. The patient has been on the floor for approximately 6 hours prior to family finding him. The patient was brought to the hospital where he was found to have mild rhabdomyolysis. There was initial concerns for possible acute cystitis however ultimately cultures were unremarkable and urinalysis was also unremarkable. The patient did have a positive blood culture however it was clinically a contaminant. The patient was monitored off antibiotics and ultimately discharged to skilled nursing facility for continuation of care and rehabilitation. Significant therapeutic interventions: As above    Significant Diagnostic Studies:   Labs:  Hematology:No results for input(s): \"WBC\", \"RBC\", \"HGB\", \"HCT\", \"MCV\", \"MCH\", \"MCHC\", \"RDW\", \"PLT\", \"MPV\", \"SEDRATE\", \"CRP\", \"INR\", \"DDIMER\", \"TM1CXSYN\", \"LABABSO\" in the last 72 hours. Invalid input(s): \"PT\"  Chemistry:  Recent Labs     09/10/23  2227 09/11/23  0508 09/11/23  1018 09/12/23  0445   NA  --   --   --  139   K  --   --   --  4.0   CL  --   --   --  109*   CO2  --   --   --  25   GLUCOSE  --   --   --  95   BUN  --   --   --  20   CREATININE  --   --   --  0.8   ANIONGAP  --   --   --  5*   LABGLOM  --   --   --  >60   CALCIUM  --   --   --  8.1*   TROPHS 33* 35* 39*  --    CKTOTAL  --  629*  --   --    No results for input(s): \"PROT\", \"LABALBU\", \"LABA1C\", \"L9YGARZ\", \"F0ICAST\", \"FT4\", \"TSH\", \"AST\", \"ALT\", \"LDH\", \"GGT\", \"ALKPHOS\", \"LABGGT\", \"BILITOT\", \"BILIDIR\", \"AMMONIA\", \"AMYLASE\", \"LIPASE\", \"LACTATE\", \"CHOL\", \"HDL\", \"LDLCHOLESTEROL\", \"CHOLHDLRATIO\", \"TRIG\", \"VLDL\", \"UYP41KM\", \"PHENYTOIN\", \"PHENYF\", \"URICACID\", \"POCGLU\" in the last 72 hours.   ABG:No results found for: \"POCPH\", \"PHART\", \"PH\", \"POCPCO2\", \"EZO0HRB\", \"PCO2\", \"POCPO2\", \"PO2ART\", \"PO2\", \"POCHCO3\", \"SZD1IVZ\", \"HCO3\", \"NBEA\", \"PBEA\", \"BEART\", \"BE\", \"THGBART\", \"THB\", \"WSM1JJM\", \"TNGJ5YML\", \"K2ZJXDIN\", \"O2SAT\", \"FIO2\"  Lab Results   Component Value Date/Time    SPECIAL 3ML RIGHT HAND 09/10/2023 02:31 PM     Lab Results   Component Value Date/Time    CULTURE POSITIVE Blood Culture (A) 09/10/2023 02:31 PM    CULTURE DIRECT GRAM STAIN FROM BOTTLE: GRAM POSITIVE RODS 09/10/2023 02:31 PM    CULTURE (A) 09/10/2023 02:31 PM     DIPHTHEROIDS A single positive blood culture of coagulase negative Staphylocci, diphtheroids,micrococci, Cutibacterium, viridans Streptocci, Bacillus, or Lactobacillus species should be interpreted with caution and viewed as a likely skin contaminant. CULTURE  09/10/2023 02:31 PM     (NOTE) Direct Gram Stain from bottle result called to and read back by:NANCY BALLESTEROS @ 0120 ON 9/13/23       Radiology:  XR HIP 3-4 VW W PELVIS BILATERAL    Result Date: 9/10/2023  No prior study. No acute finding; no fracture or dislocation. Bilateral THR hardware. No obvious hardware failure. Mariee catheter. Additional findings, as above. XR CHEST (2 VW)    Result Date: 9/10/2023  No acute airspace disease identified. CT CSpine W/O Contrast    Result Date: 9/10/2023  CT HEAD: No CT evidence for acute intracranial abnormality. . Mild cerebral atrophy. CT CERVICAL SPINE: No acute fracture or subluxation of the cervical spine. Moderate degenerative changes. CT Head W/O Contrast    Result Date: 9/10/2023  CT HEAD: No CT evidence for acute intracranial abnormality. . Mild cerebral atrophy. CT CERVICAL SPINE: No acute fracture or subluxation of the cervical spine. Moderate degenerative changes. Consultations:    Consults:     Final Specialist Recommendations/Findings:   IP CONSULT TO UROLOGY  IP CONSULT TO SPIRITUAL SERVICES      The patient was seen and examined on day of discharge and this discharge summary is in conjunction with any daily progress note from day of discharge. Discharge plan:     Dis[facility]:  [facility]    Physician Follow Up:   Rico Nielsen [address] [place] [address]  [phone]           Requiring Further Evaluation/Follow Up POST HOSPITALIZATION/Incidental Findings: None    Diet: regular diet    Activity: As tolerated    Instructions to Patient: None    Discharge Medications:      Medication List        START taking these medications      QUEtiapine 50 MG tablet  Commonly known as: SEROQUEL  Take 1 tablet by mouth nightly            CONTINUE taking these medications      aspirin 81 MG tablet     clopidogrel 75 MG tablet  Commonly known as: PLAVIX  TAKE 1 TABLET BY MOUTH ONCE DAILY     hydroCHLOROthiazide 12.5 MG tablet  Commonly known as: HYDRODIURIL  TAKE 2 TABLETS BY MOUTH DAILY     losartan 50 MG tablet  Commonly known as: COZAAR  TAKE 2 TABLETS BY MOUTH DAILY     pravastatin 40 MG tablet  Commonly known as: PRAVACHOL  TAKE 1 TABLET BY MOUTH ONCE DAILY AS DIRECTED            STOP taking these medications      doxycycline hyclate 100 MG capsule  Commonly known as: VIBRAMYCIN               Where to Get Your Medications        These medications were sent to [address] [place] [address] [phone] - F [phone]  [address] [place] [id number]      Phone: [phone]   QUEtiapine 50 MG tablet       Information about where to get these medications is not yet available    Ask your nurse or doctor about these medications  clopidogrel 75 MG tablet         Time spent on discharge is 20 mins in patient examination, evaluation, counseling as well as medication reconciliation, prescriptions for required medications, discharge plan and follow up. Electronically signed by   Maya Nova DO  9/13/2023  4:36 PM      Thank you Dr. Sudhakar Abraham primary care provider on file. for the opportunity to be involved in this patient's care.

## 2023-09-13 NOTE — PROGRESS NOTES
[facility]  Office: [phone]  Lora Rodriguez DO, Odell Vitale DO, Coni Stratton, DO, Paula Brink MD, Beltran Mae MD, Nadya Mckoy MD, Ruba Menendez MD,  Dion Diaz MD, Brina Blankenship MD, Hedy Arreola DO, Mk Lowery MD,  Christian Wang MD, Miguel A Lynch MD, Rduy Garg DO, Edwin Veras MD,  Anayeli Villa DO, Cortney Terry MD, Jovita Vences MD, Jennifer Gongora MD, Renetta Potts MD,  Kendall Rodriguez MD, Laith Herrera MD, Becky Britton MD, Filiberot Guan MD, Deirdre Brown DO, Rufina Portillo MD,  Oanh Galaviz MD, Oneil Robertson MD, Alla Ambrocio, LUX,  Rosa Prather, CNP,, Lynda Gottron, CNP,  Zaria Mccarthy, [facility], Nunu Chong, CNP, Marcy Quijano, CNP, Kelsey Mullins, CNP, Jolly Campbell, CNP, Tiffany Conte, CNP, Claudina Scheuermann, CNP, Zenaida Richardson, [facility], Leni De La Paz, CNP, Faviola Mohs, [address] [place] [address]    Progress Note    9/13/2023    11:49 AM    Name:   Marlen Arellano  MRN:     [id number]     Acct:      [de-identified]   Room:   36/18-26  IP Day:  3  Admit Date:  9/10/2023 12:28 PM    PCP:   No primary care provider on file. Code Status:  DNR-CCA    Subjective:     Patient seen in follow-up for fall, rhabdo myelitis, suspected acute cystitis without hematuria. Patient states \"I am hungry\"    Patient slept well overnight with the addition of Klonopin. He is awake and answers questions appropriately. He still only oriented to himself which appears to be his baseline. He does have a positive blood culture and I have titrated his antibiotic to Zosyn however the blood culture appears to be a contaminant. I anticipate that antibiotics can be discontinued on discharge. The patient does not appear to have an active infection at this point in time and clinically is doing well.   Once arrangements are made he can be discharged to skilled nursing facility for changes.        Physical Examination:     General appearance:  alert, cooperative and no distress  Mental Status:  oriented to person, place and time and normal affect  Lungs:  clear to auscultation bilaterally, normal effort  Heart:  regular rate and rhythm, no murmur  Abdomen:  soft, nontender, nondistended, normal bowel sounds, no masses, hepatomegaly, splenomegaly  Extremities:  no edema, redness, tenderness in the calves  Skin:  no gross lesions, rashes, induration    Assessment:     Hospital Problems             Last Modified POA    * (Principal) Rhabdomyolysis 9/10/2023 Yes    HTN (hypertension) (Chronic) 9/10/2023 Yes    Hyperlipidemia (Chronic) 9/10/2023 Yes    Acute cystitis without hematuria 9/10/2023 Yes    Urinary retention 9/11/2023 Yes    Gross hematuria 9/11/2023 Yes       Plan:     Fall with rhabdomyolysis  PT/OT  Discharge planning to skilled nursing facility once arrangements made  Positive blood culture  Likely contaminant, will transition over to Zosyn for the time being however anticipate that upon discharge antibiotics can be discontinued  Acute cystitis with hematuria  Clinically resolved  Voiding trial  Dyslipidemia  Continue pravastatin  Insomnia  Improved with the addition of Klonopin, continue Seroquel and Klonopin    Discharge once arrangements made    Medical Decision Making: Rahul Salvador DO  9/13/2023  11:49 AM

## 2023-09-13 NOTE — PROGRESS NOTES
[facility] NOTE    Room # 514/198-39   Name: Elisa Cordon            Jew: non-Lutheran      Reason for visit: Follow up    I visited the  patient and family . Admit Date & Time: 9/10/2023 12:28 PM    Assessment:  Elisa Cordon is a 80 y.o. male in the hospital because \"rhabdomyolysis\". Upon entering the room patient was lying in bed. Patient's son was standing bedside. Patient engaged in life review. Patient was very talkative, yet pt still seemed confused. He was able to recall many memories from years ago, but seemed to have much difficulty recalling and understanding recent events. Patient's son appeared to be very supportive of patient. Intervention:  I introduced myself and my title as  I offered space for patient and pt's son  to express feelings, needs, and concerns and provided a ministry presence. This writer actively listened to patient and patient's son. This writer offered words of affirmation. Outcome:  Patient and pt's son expressed gratitude for this visit     Plan:  Chaplains will remain available to offer spiritual and emotional support as needed. Electronically signed by Karyn Haney on 9/13/2023 at [address] [facility] [address]        09/13/23 1350   Encounter Summary   Encounter Overview/Reason  Follow-up   Service Provided For: Patient and family together   Referral/Consult From: Nurse   Support System Children;Family members   Last Encounter  09/13/23   Complexity of Encounter Moderate   Begin Time 1335   End Time  1350   Total Time Calculated 15 min   Spiritual/Emotional needs   Type Spiritual Support   Assessment/Intervention/Outcome   Assessment Impaired social interaction;Calm   Intervention Active listening;Sustaining Presence/Ministry of presence; Life review/Legacy   Outcome Engaged in conversation;Expressed Gratitude

## 2023-09-13 NOTE — PROGRESS NOTES
Physical Therapy  Facility/Department: [facility] PROGRESSIVE CARE  Physical Therapy Daily Treatment Note    Name: Elisa Cordon  : 1934  MRN: [id number]  Date of Service: 2023    Discharge Recommendations:  Patient would benefit from continued therapy after discharge          Patient Diagnosis(es): The primary encounter diagnosis was Fall, initial encounter. A diagnosis of Traumatic rhabdomyolysis, initial encounter [facility]) was also pertinent to this visit. Past Medical History:  has a past medical history of Arthritis, Arthropathy, unspecified, site unspecified, Coronary atherosclerosis of unspecified type of vessel, native or graft, Other and unspecified hyperlipidemia, and Unspecified essential hypertension. Past Surgical History:  has a past surgical history that includes Knee arthroscopy; Coronary angioplasty with stent; Total hip arthroplasty; and Total shoulder arthroplasty (Right). Assessment   Body Structures, Functions, Activity Limitations Requiring Skilled Therapeutic Intervention: Decreased functional mobility ; Decreased endurance;Decreased safe awareness;Decreased strength;Decreased balance;Decreased posture    Assessment: Pt requiring min for bed mobility, CGA for stand to sit from bed and min from chair, min for gait 100ft with rolling walker. Pt oriented to self only and recognized son in room on occasion. Pt currently not appropriate or safe for return to prior living environment. Pt will benefit from continued PT for strengthening, orientation, balance and functional mobility training while in the hospital and at discharge.     Requires PT Follow-Up: Yes  Activity Tolerance  Activity Tolerance: Treatment limited secondary to decreased cognition     Plan   Physcial Therapy Plan  General Plan: Other (See Comment) (5-6x/wk)  Current Treatment Recommendations: Strengthening, Balance training, Functional mobility training, Transfer training, Gait training, Cognitive reorientation, [address], PT

## 2023-09-13 NOTE — PLAN OF CARE
Problem: Discharge Planning  Goal: Discharge to home or other facility with appropriate resources  9/13/2023 0201 by Favio Villagomez RN  Outcome: Progressing  Flowsheets (Taken 9/12/2023 2000)  Discharge to home or other facility with appropriate resources: Identify barriers to discharge with patient and caregiver  9/12/2023 1731 by Melchor Ibrahim RN  Outcome: Progressing     Problem: Safety - Adult  Goal: Free from fall injury  9/13/2023 0201 by Favio Villagomez RN  Outcome: Progressing  9/12/2023 1731 by Melchor Ibrahim RN  Outcome: Progressing     Problem: Skin/Tissue Integrity  Goal: Absence of new skin breakdown  Description: 1. Monitor for areas of redness and/or skin breakdown  2. Assess vascular access sites hourly  3. Every 4-6 hours minimum:  Change oxygen saturation probe site  4. Every 4-6 hours:  If on nasal continuous positive airway pressure, respiratory therapy assess nares and determine need for appliance change or resting period. 9/13/2023 0201 by Favio Villagomez RN  Outcome: Progressing  9/12/2023 1731 by Melchor Ibrahim RN  Outcome: Progressing     Problem: ABCDS Injury Assessment  Goal: Absence of physical injury  9/13/2023 0201 by Favio Villagomez RN  Outcome: Progressing  9/12/2023 1731 by Melchor Ibrahim RN  Outcome: Progressing     Problem: Confusion  Goal: Confusion, delirium, dementia, or psychosis is improved or at baseline  Description: INTERVENTIONS:  1. Assess for possible contributors to thought disturbance, including medications, impaired vision or hearing, underlying metabolic abnormalities, dehydration, psychiatric diagnoses, and notify attending LIP  2. [place] high risk fall precautions, as indicated  3. Provide frequent short contacts to provide reality reorientation, refocusing and direction  4. Decrease environmental stimuli, including noise as appropriate  5. Monitor and intervene to maintain adequate nutrition, hydration, elimination, sleep and activity  6.  If unable to

## 2023-09-14 VITALS
HEIGHT: 72 IN | SYSTOLIC BLOOD PRESSURE: 114 MMHG | WEIGHT: 186.29 LBS | OXYGEN SATURATION: 98 % | TEMPERATURE: 98.2 F | RESPIRATION RATE: 16 BRPM | HEART RATE: 57 BPM | DIASTOLIC BLOOD PRESSURE: 74 MMHG | BODY MASS INDEX: 25.23 KG/M2

## 2023-09-14 PROCEDURE — 6360000002 HC RX W HCPCS: Performed by: STUDENT IN AN ORGANIZED HEALTH CARE EDUCATION/TRAINING PROGRAM

## 2023-09-14 PROCEDURE — 2580000003 HC RX 258: Performed by: HOSPITALIST

## 2023-09-14 PROCEDURE — 6360000002 HC RX W HCPCS: Performed by: HOSPITALIST

## 2023-09-14 PROCEDURE — 2580000003 HC RX 258: Performed by: STUDENT IN AN ORGANIZED HEALTH CARE EDUCATION/TRAINING PROGRAM

## 2023-09-14 PROCEDURE — 6370000000 HC RX 637 (ALT 250 FOR IP): Performed by: STUDENT IN AN ORGANIZED HEALTH CARE EDUCATION/TRAINING PROGRAM

## 2023-09-14 PROCEDURE — 99232 SBSQ HOSP IP/OBS MODERATE 35: CPT | Performed by: HOSPITALIST

## 2023-09-14 RX ADMIN — PRAVASTATIN SODIUM 40 MG: 20 TABLET ORAL at 07:43

## 2023-09-14 RX ADMIN — SODIUM CHLORIDE, PRESERVATIVE FREE 10 ML: 5 INJECTION INTRAVENOUS at 07:43

## 2023-09-14 RX ADMIN — ENOXAPARIN SODIUM 40 MG: 100 INJECTION SUBCUTANEOUS at 07:43

## 2023-09-14 RX ADMIN — PIPERACILLIN AND TAZOBACTAM 3375 MG: 3; .375 INJECTION, POWDER, LYOPHILIZED, FOR SOLUTION INTRAVENOUS at 00:20

## 2023-09-14 RX ADMIN — CLOPIDOGREL BISULFATE 75 MG: 75 TABLET ORAL at 07:43

## 2023-09-14 RX ADMIN — ASPIRIN 81 MG: 81 TABLET, COATED ORAL at 07:43

## 2023-09-14 RX ADMIN — PIPERACILLIN AND TAZOBACTAM 3375 MG: 3; .375 INJECTION, POWDER, LYOPHILIZED, FOR SOLUTION INTRAVENOUS at 06:47

## 2023-09-14 NOTE — PLAN OF CARE
Problem: Discharge Planning  Goal: Discharge to home or other facility with appropriate resources  Outcome: Progressing   Patient actively participates in ADLs and decision making regarding plan of care. Problem: Safety - Adult  Goal: Free from fall injury  Outcome: Progressing   No falls/injuries this shift, bed in lowest position, brakes on, bed alarm on, call light in reach, side rails up x2. Problem: Skin/Tissue Integrity  Goal: Absence of new skin breakdown  Description: 1. Monitor for areas of redness and/or skin breakdown  2. Assess vascular access sites hourly  3. Every 4-6 hours minimum:  Change oxygen saturation probe site  4. Every 4-6 hours:  If on nasal continuous positive airway pressure, respiratory therapy assess nares and determine need for appliance change or resting period. Outcome: Progressing   No new skin breakdown noted, no new signs/symptoms of infection, continue to monitor lab work including WBC, medications administered per physician orders.

## 2023-09-14 NOTE — PROGRESS NOTES
[facility]  Office: [phone]  Precious Elizabeth, DO, Cecil Ana Luisa, DO, Ulbrad Maria Blood, DO, Honey Boast, MD, Dania Styles MD, Norma Mart MD, Zhane Maier MD,  Jaki Christensen MD, Wilber oDty MD, [place] Davis, DO, Sushant Kumar MD,  Cathy Garza MD, Dillon Ruby MD, Elza Freed DO, Marcela Bass MD,  Surekha Irvin MD, Noel Martínez MD, Donnie Parr MD, Rita Ortiz MD,  Afia Olivas MD, Eden Romeo MD, Priscilla Villela MD, Shira Garcia MD, Onel Barrios DO, Katharina Mccormack MD,  Yvonne De La Cruz MD, Halina Meek MD, Ivis Salinas, CNP,  Claire Quiroga, CNP,, Perla Obando, CNP,  Abel Lisa, [facility], Cody Hay, CNP, Paul Wiggins CNP, Jacquelyn Closs, CNP, Evi Clayton CNP, Geeta Siegel, CNP, Yahaira Arreaga, CNP, Lashay Kimbrough, CNS, Kasey Muñoz, CNP, Antonette Monsalve, [address] [place] [address] Drive    Progress Note    9/14/2023    11:05 AM    Name:   Luis M Doe  MRN:     [id number]     Acct:      [de-identified]   Room:   [address] [place] [address] Day:  4  Admit Date:  9/10/2023 12:28 PM    PCP:   No primary care provider on file. Code Status:  DNR-CCA    Subjective:     Patient seen in follow-up for fall with mild rhabdomyolysis. Patient denies any complaints at this point in time. No major issues overnight. Patient continues to be at his baseline. He does have underlying confusion which is not new and likely secondary to underlying dementia. The patient's blood culture was indeed a contaminant. At this point time antibiotics have been discontinued. He is hemodynamically stable and can be transferred to skilled nursing facility for rehabilitation. Medications:      Allergies:  No Known Allergies    Current Meds:   Scheduled Meds:    clonazePAM  0.5 mg Oral Nightly    QUEtiapine  50 mg Oral Nightly    aspirin  81 mg Oral Daily    clopidogrel  75 mg Oral CULTURE POSITIVE Blood Culture (A) 09/10/2023 02:31 PM    CULTURE DIRECT GRAM STAIN FROM BOTTLE: [address] 09/10/2023 02:31 PM    CULTURE (A) 09/10/2023 02:31 PM     DIPHTHEROIDS A single positive blood culture of coagulase negative Staphylocci, diphtheroids,micrococci, Cutibacterium, viridans Streptocci, Bacillus, or Lactobacillus species should be interpreted with caution and viewed as a likely skin contaminant. CULTURE  09/10/2023 02:31 PM     (NOTE) Direct Gram Stain from bottle result called to and read back by:NANCY BALLESTEROS @ 0120 ON 9/13/23       Radiology:  XR HIP 3-4 VW W PELVIS BILATERAL    Result Date: 9/10/2023  No prior study. No acute finding; no fracture or dislocation. Bilateral THR hardware. No obvious hardware failure. Mariee catheter. Additional findings, as above. XR CHEST (2 VW)    Result Date: 9/10/2023  No acute airspace disease identified. CT CSpine W/O Contrast    Result Date: 9/10/2023  CT HEAD: No CT evidence for acute intracranial abnormality. . Mild cerebral atrophy. CT CERVICAL SPINE: No acute fracture or subluxation of the cervical spine. Moderate degenerative changes. CT Head W/O Contrast    Result Date: 9/10/2023  CT HEAD: No CT evidence for acute intracranial abnormality. . Mild cerebral atrophy. CT CERVICAL SPINE: No acute fracture or subluxation of the cervical spine. Moderate degenerative changes.        Physical Examination:     General appearance:  alert, cooperative and no distress  Mental Status:  oriented to person only  Lungs:  clear to auscultation bilaterally, normal effort  Heart:  regular rate and rhythm, no murmur  Abdomen:  soft, nontender, nondistended, normal bowel sounds, no masses, hepatomegaly, splenomegaly  Extremities:  no edema, redness, tenderness in the calves  Skin:  no gross lesions, rashes, induration    Assessment:     Hospital Problems             Last Modified POA    * (Principal) Rhabdomyolysis 9/10/2023 Yes    HTN (hypertension)

## 2023-09-14 NOTE — PLAN OF CARE
Problem: Discharge Planning  Goal: Discharge to home or other facility with appropriate resources  9/14/2023 0949 by Cyndy Alexandra RN  Outcome: Adequate for Discharge  9/14/2023 0948 by Cyndy Alexandra RN  Outcome: Progressing     Problem: Safety - Adult  Goal: Free from fall injury  9/14/2023 0949 by Cyndy Alexandra RN  Outcome: Adequate for Discharge  9/14/2023 0948 by Cyndy Alexandra RN  Outcome: Progressing     Problem: Skin/Tissue Integrity  Goal: Absence of new skin breakdown  Description: 1. Monitor for areas of redness and/or skin breakdown  2. Assess vascular access sites hourly  3. Every 4-6 hours minimum:  Change oxygen saturation probe site  4. Every 4-6 hours:  If on nasal continuous positive airway pressure, respiratory therapy assess nares and determine need for appliance change or resting period. 9/14/2023 0949 by Cyndy Alexandra RN  Outcome: Adequate for Discharge  9/14/2023 2916 by Cyndy Alexandra RN  Outcome: Progressing     Problem: ABCDS Injury Assessment  Goal: Absence of physical injury  Outcome: Adequate for Discharge     Problem: Confusion  Goal: Confusion, delirium, dementia, or psychosis is improved or at baseline  Description: INTERVENTIONS:  1. Assess for possible contributors to thought disturbance, including medications, impaired vision or hearing, underlying metabolic abnormalities, dehydration, psychiatric diagnoses, and notify attending LIP  2. [place] high risk fall precautions, as indicated  3. Provide frequent short contacts to provide reality reorientation, refocusing and direction  4. Decrease environmental stimuli, including noise as appropriate  5. Monitor and intervene to maintain adequate nutrition, hydration, elimination, sleep and activity  6. If unable to ensure safety without constant attention obtain sitter and review sitter guidelines with assigned personnel  7.  Initiate Psychosocial CNS and Spiritual Care consult, as

## 2023-09-14 NOTE — CARE COORDINATION
Transport by [facility] to [facility] by stretcher     Packet:    [facility]  24hr [facility]  Transfer Report  RN Report [phone]    Orders faxed [phone]

## 2023-09-15 LAB
MICROORGANISM SPEC CULT: NORMAL
SERVICE CMNT-IMP: NORMAL
SPECIMEN DESCRIPTION: NORMAL

## 2023-09-22 ENCOUNTER — APPOINTMENT (OUTPATIENT)
Dept: CT IMAGING | Age: 88
End: 2023-09-22
Payer: OTHER GOVERNMENT

## 2023-09-22 ENCOUNTER — HOSPITAL ENCOUNTER (INPATIENT)
Age: 88
LOS: 14 days | Discharge: NURSING, REHAB OR LONG-TERM CARE | End: 2023-10-06
Attending: EMERGENCY MEDICINE | Admitting: STUDENT IN AN ORGANIZED HEALTH CARE EDUCATION/TRAINING PROGRAM
Payer: OTHER GOVERNMENT

## 2023-09-22 DIAGNOSIS — N39.0 URINARY TRACT INFECTION WITHOUT HEMATURIA, SITE UNSPECIFIED: ICD-10-CM

## 2023-09-22 DIAGNOSIS — R33.8 ACUTE URINARY RETENTION: Primary | ICD-10-CM

## 2023-09-22 DIAGNOSIS — N17.9 ACUTE RENAL FAILURE, UNSPECIFIED ACUTE RENAL FAILURE TYPE (HCC): ICD-10-CM

## 2023-09-22 LAB
ALBUMIN SERPL-MCNC: 3.3 G/DL (ref 3.5–5.2)
ALBUMIN/GLOB SERPL: 0.9 {RATIO} (ref 1–2.5)
ALP SERPL-CCNC: 80 U/L (ref 40–129)
ALT SERPL-CCNC: 27 U/L (ref 5–41)
ANION GAP SERPL CALCULATED.3IONS-SCNC: 16 MMOL/L (ref 9–17)
AST SERPL-CCNC: 40 U/L
BACTERIA URNS QL MICRO: ABNORMAL
BASOPHILS # BLD: 0.01 K/UL (ref 0–0.2)
BASOPHILS NFR BLD: 0 % (ref 0–2)
BILIRUB SERPL-MCNC: 0.7 MG/DL (ref 0.3–1.2)
BILIRUB UR QL STRIP: NEGATIVE
BUN SERPL-MCNC: 90 MG/DL (ref 8–23)
CALCIUM SERPL-MCNC: 8.4 MG/DL (ref 8.6–10.4)
CHARACTER UR: ABNORMAL
CHLORIDE SERPL-SCNC: 103 MMOL/L (ref 98–107)
CLARITY UR: ABNORMAL
CO2 SERPL-SCNC: 18 MMOL/L (ref 20–31)
COLOR UR: YELLOW
CREAT SERPL-MCNC: 7.1 MG/DL (ref 0.7–1.2)
EOSINOPHIL # BLD: 0.01 K/UL (ref 0–0.4)
EOSINOPHILS RELATIVE PERCENT: 0 % (ref 1–4)
EPI CELLS #/AREA URNS HPF: ABNORMAL /HPF (ref 0–5)
ERYTHROCYTE [DISTWIDTH] IN BLOOD BY AUTOMATED COUNT: 15.5 % (ref 12.5–15.4)
GFR SERPL CREATININE-BSD FRML MDRD: 7 ML/MIN/1.73M2
GLUCOSE BLD-MCNC: 80 MG/DL (ref 75–110)
GLUCOSE SERPL-MCNC: 105 MG/DL (ref 70–99)
GLUCOSE UR STRIP-MCNC: NEGATIVE MG/DL
HCT VFR BLD AUTO: 41.4 % (ref 41–53)
HGB BLD-MCNC: 13.5 G/DL (ref 13.5–17.5)
HGB UR QL STRIP.AUTO: ABNORMAL
KETONES UR STRIP-MCNC: NEGATIVE MG/DL
LACTATE BLDV-SCNC: 1.3 MMOL/L (ref 0.5–2.2)
LEUKOCYTE ESTERASE UR QL STRIP: ABNORMAL
LYMPHOCYTES NFR BLD: 0.89 K/UL (ref 1–4.8)
LYMPHOCYTES RELATIVE PERCENT: 8 % (ref 24–44)
MAGNESIUM SERPL-MCNC: 2.6 MG/DL (ref 1.6–2.6)
MCH RBC QN AUTO: 32.5 PG (ref 26–34)
MCHC RBC AUTO-ENTMCNC: 32.6 G/DL (ref 31–37)
MCV RBC AUTO: 99.8 FL (ref 80–100)
MONOCYTES NFR BLD: 0.83 K/UL (ref 0.1–1.2)
MONOCYTES NFR BLD: 7 % (ref 2–11)
MUCOUS THREADS URNS QL MICRO: ABNORMAL
NEUTROPHILS NFR BLD: 85 % (ref 36–66)
NEUTS SEG NFR BLD: 9.56 K/UL (ref 1.8–7.7)
NITRITE UR QL STRIP: NEGATIVE
PH UR STRIP: 6 [PH] (ref 5–8)
PLATELET # BLD AUTO: 241 K/UL (ref 140–450)
PMV BLD AUTO: 10.3 FL (ref 8–14)
POTASSIUM SERPL-SCNC: 4.8 MMOL/L (ref 3.7–5.3)
PROT SERPL-MCNC: 6.8 G/DL (ref 6.4–8.3)
PROT UR STRIP-MCNC: NEGATIVE MG/DL
RBC # BLD AUTO: 4.15 M/UL (ref 4.5–5.9)
RBC #/AREA URNS HPF: ABNORMAL /HPF (ref 0–2)
SODIUM SERPL-SCNC: 137 MMOL/L (ref 135–144)
SP GR UR STRIP: 1.01 (ref 1–1.03)
UROBILINOGEN UR STRIP-ACNC: NORMAL EU/DL (ref 0–1)
WBC #/AREA URNS HPF: ABNORMAL /HPF (ref 0–5)
WBC OTHER # BLD: 11.3 K/UL (ref 3.5–11)

## 2023-09-22 PROCEDURE — 83605 ASSAY OF LACTIC ACID: CPT

## 2023-09-22 PROCEDURE — 1210000000 HC MED SURG R&B

## 2023-09-22 PROCEDURE — 6370000000 HC RX 637 (ALT 250 FOR IP): Performed by: STUDENT IN AN ORGANIZED HEALTH CARE EDUCATION/TRAINING PROGRAM

## 2023-09-22 PROCEDURE — 2580000003 HC RX 258: Performed by: STUDENT IN AN ORGANIZED HEALTH CARE EDUCATION/TRAINING PROGRAM

## 2023-09-22 PROCEDURE — 6360000002 HC RX W HCPCS: Performed by: EMERGENCY MEDICINE

## 2023-09-22 PROCEDURE — 6360000002 HC RX W HCPCS: Performed by: STUDENT IN AN ORGANIZED HEALTH CARE EDUCATION/TRAINING PROGRAM

## 2023-09-22 PROCEDURE — 83735 ASSAY OF MAGNESIUM: CPT

## 2023-09-22 PROCEDURE — 81001 URINALYSIS AUTO W/SCOPE: CPT

## 2023-09-22 PROCEDURE — 2060000000 HC ICU INTERMEDIATE R&B

## 2023-09-22 PROCEDURE — 80053 COMPREHEN METABOLIC PANEL: CPT

## 2023-09-22 PROCEDURE — 99285 EMERGENCY DEPT VISIT HI MDM: CPT

## 2023-09-22 PROCEDURE — 74176 CT ABD & PELVIS W/O CONTRAST: CPT

## 2023-09-22 PROCEDURE — 2580000003 HC RX 258: Performed by: EMERGENCY MEDICINE

## 2023-09-22 PROCEDURE — 36415 COLL VENOUS BLD VENIPUNCTURE: CPT

## 2023-09-22 PROCEDURE — 51702 INSERT TEMP BLADDER CATH: CPT

## 2023-09-22 PROCEDURE — 87040 BLOOD CULTURE FOR BACTERIA: CPT

## 2023-09-22 PROCEDURE — 2580000003 HC RX 258: Performed by: NURSE PRACTITIONER

## 2023-09-22 PROCEDURE — 99222 1ST HOSP IP/OBS MODERATE 55: CPT | Performed by: STUDENT IN AN ORGANIZED HEALTH CARE EDUCATION/TRAINING PROGRAM

## 2023-09-22 PROCEDURE — 82947 ASSAY GLUCOSE BLOOD QUANT: CPT

## 2023-09-22 PROCEDURE — 85025 COMPLETE CBC W/AUTO DIFF WBC: CPT

## 2023-09-22 RX ORDER — QUETIAPINE FUMARATE 25 MG/1
50 TABLET, FILM COATED ORAL NIGHTLY
Status: DISCONTINUED | OUTPATIENT
Start: 2023-09-22 | End: 2023-10-06 | Stop reason: HOSPADM

## 2023-09-22 RX ORDER — SODIUM CHLORIDE 9 MG/ML
INJECTION, SOLUTION INTRAVENOUS PRN
Status: DISCONTINUED | OUTPATIENT
Start: 2023-09-22 | End: 2023-10-06 | Stop reason: HOSPADM

## 2023-09-22 RX ORDER — ACETAMINOPHEN 650 MG/1
650 SUPPOSITORY RECTAL EVERY 6 HOURS PRN
Status: DISCONTINUED | OUTPATIENT
Start: 2023-09-22 | End: 2023-10-06 | Stop reason: HOSPADM

## 2023-09-22 RX ORDER — ONDANSETRON 2 MG/ML
4 INJECTION INTRAMUSCULAR; INTRAVENOUS EVERY 6 HOURS PRN
Status: DISCONTINUED | OUTPATIENT
Start: 2023-09-22 | End: 2023-10-06 | Stop reason: HOSPADM

## 2023-09-22 RX ORDER — SODIUM CHLORIDE 0.9 % (FLUSH) 0.9 %
5-40 SYRINGE (ML) INJECTION EVERY 12 HOURS SCHEDULED
Status: DISCONTINUED | OUTPATIENT
Start: 2023-09-22 | End: 2023-10-06 | Stop reason: HOSPADM

## 2023-09-22 RX ORDER — LIDOCAINE HYDROCHLORIDE 20 MG/ML
JELLY TOPICAL ONCE
Status: DISCONTINUED | OUTPATIENT
Start: 2023-09-22 | End: 2023-09-22

## 2023-09-22 RX ORDER — 0.9 % SODIUM CHLORIDE 0.9 %
1000 INTRAVENOUS SOLUTION INTRAVENOUS ONCE
Status: COMPLETED | OUTPATIENT
Start: 2023-09-22 | End: 2023-09-22

## 2023-09-22 RX ORDER — SODIUM CHLORIDE 0.9 % (FLUSH) 0.9 %
5-40 SYRINGE (ML) INJECTION PRN
Status: DISCONTINUED | OUTPATIENT
Start: 2023-09-22 | End: 2023-10-06 | Stop reason: HOSPADM

## 2023-09-22 RX ORDER — ACETAMINOPHEN 325 MG/1
650 TABLET ORAL EVERY 6 HOURS PRN
Status: DISCONTINUED | OUTPATIENT
Start: 2023-09-22 | End: 2023-10-06 | Stop reason: HOSPADM

## 2023-09-22 RX ORDER — POLYETHYLENE GLYCOL 3350 17 G/17G
17 POWDER, FOR SOLUTION ORAL DAILY PRN
Status: DISCONTINUED | OUTPATIENT
Start: 2023-09-22 | End: 2023-10-06 | Stop reason: HOSPADM

## 2023-09-22 RX ORDER — LORAZEPAM 2 MG/ML
1 INJECTION INTRAMUSCULAR EVERY 4 HOURS PRN
Status: DISCONTINUED | OUTPATIENT
Start: 2023-09-22 | End: 2023-10-06 | Stop reason: HOSPADM

## 2023-09-22 RX ORDER — ONDANSETRON 4 MG/1
4 TABLET, ORALLY DISINTEGRATING ORAL EVERY 8 HOURS PRN
Status: DISCONTINUED | OUTPATIENT
Start: 2023-09-22 | End: 2023-10-06 | Stop reason: HOSPADM

## 2023-09-22 RX ORDER — HEPARIN SODIUM 5000 [USP'U]/ML
5000 INJECTION, SOLUTION INTRAVENOUS; SUBCUTANEOUS EVERY 8 HOURS SCHEDULED
Status: DISCONTINUED | OUTPATIENT
Start: 2023-09-22 | End: 2023-10-06 | Stop reason: HOSPADM

## 2023-09-22 RX ORDER — SODIUM CHLORIDE 9 MG/ML
INJECTION, SOLUTION INTRAVENOUS CONTINUOUS
Status: DISCONTINUED | OUTPATIENT
Start: 2023-09-22 | End: 2023-09-26

## 2023-09-22 RX ORDER — MORPHINE SULFATE 2 MG/ML
2 INJECTION, SOLUTION INTRAMUSCULAR; INTRAVENOUS EVERY 4 HOURS PRN
Status: DISCONTINUED | OUTPATIENT
Start: 2023-09-22 | End: 2023-10-06 | Stop reason: HOSPADM

## 2023-09-22 RX ADMIN — CEFTRIAXONE SODIUM 1000 MG: 1 INJECTION, POWDER, FOR SOLUTION INTRAMUSCULAR; INTRAVENOUS at 16:36

## 2023-09-22 RX ADMIN — SODIUM CHLORIDE, PRESERVATIVE FREE 10 ML: 5 INJECTION INTRAVENOUS at 22:52

## 2023-09-22 RX ADMIN — HEPARIN SODIUM 5000 UNITS: 5000 INJECTION INTRAVENOUS; SUBCUTANEOUS at 22:51

## 2023-09-22 RX ADMIN — QUETIAPINE FUMARATE 50 MG: 25 TABLET ORAL at 22:51

## 2023-09-22 RX ADMIN — SODIUM CHLORIDE 1000 ML: 9 INJECTION, SOLUTION INTRAVENOUS at 14:23

## 2023-09-22 RX ADMIN — SODIUM CHLORIDE: 9 INJECTION, SOLUTION INTRAVENOUS at 23:46

## 2023-09-22 ASSESSMENT — PAIN SCALES - WONG BAKER: WONGBAKER_NUMERICALRESPONSE: 6

## 2023-09-22 ASSESSMENT — PAIN DESCRIPTION - ORIENTATION: ORIENTATION: RIGHT

## 2023-09-22 ASSESSMENT — PAIN SCALES - GENERAL: PAINLEVEL_OUTOF10: 0

## 2023-09-22 ASSESSMENT — PAIN - FUNCTIONAL ASSESSMENT: PAIN_FUNCTIONAL_ASSESSMENT: 0-10

## 2023-09-22 ASSESSMENT — PAIN DESCRIPTION - LOCATION: LOCATION: SHOULDER

## 2023-09-22 NOTE — PROGRESS NOTES
.Patient admitted to room 344. VS taken and call light given/within reach. Patient given room orientation. Orders released/reviewed, initial assessment completed and navigator started. See chart for more detail. Bed alarm in use. Family at bedside.

## 2023-09-22 NOTE — H&P
[facility]  Office: [phone]  Raysa Solders, DO, Kimangel Barfield, DO, Meagan Matthew Blood, DO, Eusebia Degroot MD, Octaviano Cuellar MD, Diane Angel MD, Eirka Stacy MD,  Adi Smith MD, Granville Aschoff, MD, Jonnie Davis DO, Irma Wolf MD,  Liya Fermin MD, Daja Paul MD, Carmen Clemons DO, Jessica Damon MD,  Brittani Gonzalez MD, Ian Perez MD, Nii Sotomayor MD, Cindy Kiser MD,  Paige Newsome MD, Will Crow MD, Ivory Juarez MD, Charlene Fulton MD, Jose Juan Griffith DO, Colby Morales MD,  David Muniz MD, Micah Luke MD, Karely Dorado, CNP,  Amanda Cortez, CNP,, Leticia Hall, CNP,   [facility], [facility], Faviola Whitehead, CNP, Sam Sahni, CNP, [facility], CNP, Tee Potts, CNP, Ta Hdz, CNP, Kenia Ricardo, CNP, Cooper Diaz, CNS, Catie Ramirez, [facility], Javan Macedo[address]    HISTORY AND PHYSICAL EXAMINATION            Date:   9/22/2023  Patient name:  Christian Del Real  Date of admission:  9/22/2023 12:23 PM  MRN:   [id number]  Account:  [de-identified]  YOB: 1934  PCP:    No primary care provider on file. Room:   ER10/ER10  Code Status:    Prior      History Obtained From:     Family; patient is currently lethargic     History of Present Illness:     Christian Del Real is an 59-year-old male with a past medical history of hypertension and hyperlipidemia who presented to the emergency department on 9/22/2023 after being found to have an elevated creatinine at his nursing facility. In the ED, the patient was afebrile but lethargic and ill-appearing. He was found to be in acute renal failure with a creatinine of 7.1 (BUN 90). The initial plan was to transfer the patient to [facility] for initiation of hemodialysis but family was contacted and elected to stop aggressive care and pursue Hospice services.  The patient is Anion Gap 16 9 - 17 mmol/L    Glucose 105 (H) 70 - 99 mg/dL    BUN 90 (H) 8 - 23 mg/dL    Creatinine 7.1 (HH) 0.7 - 1.2 mg/dL    Est, Glom Filt Rate 7 (L) >60 mL/min/1.73m2    Calcium 8.4 (L) 8.6 - 10.4 mg/dL    Total Protein 6.8 6.4 - 8.3 g/dL    Albumin 3.3 (L) 3.5 - 5.2 g/dL    Albumin/Globulin Ratio 0.9 (L) 1.0 - 2.5    Total Bilirubin 0.7 0.3 - 1.2 mg/dL    Alkaline Phosphatase 80 40 - 129 U/L    ALT 27 5 - 41 U/L    AST 40 (H) <40 U/L   Magnesium    Collection Time: 09/22/23  1:40 PM   Result Value Ref Range    Magnesium 2.6 1.6 - 2.6 mg/dL   Lactic Acid    Collection Time: 09/22/23  1:40 PM   Result Value Ref Range    Lactic Acid 1.3 0.5 - 2.2 mmol/L   Urinalysis    Collection Time: 09/22/23  2:00 PM   Result Value Ref Range    Color, UA Yellow Yellow    Turbidity UA SLIGHTLY CLOUDY (A) Clear    Glucose, Ur NEGATIVE NEGATIVE mg/dL    Bilirubin Urine NEGATIVE NEGATIVE    Ketones, Urine NEGATIVE NEGATIVE mg/dL    Specific Gravity, UA 1.015 1.005 - 1.030    Urine Hgb MODERATE (A) NEGATIVE    pH, UA 6.0 5.0 - 8.0    Protein, UA NEGATIVE NEGATIVE mg/dL    Urobilinogen, Urine Normal 0.0 - 1.0 EU/dL    Nitrite, Urine NEGATIVE NEGATIVE    Leukocyte Esterase, Urine TRACE (A) NEGATIVE   Microscopic Urinalysis    Collection Time: 09/22/23  2:00 PM   Result Value Ref Range    WBC, UA 5 TO 10 0 - 5 /HPF    RBC, UA 20 TO 50 0 - 2 /HPF    Epithelial Cells UA 5 TO 10 0 - 5 /HPF    Bacteria, UA MODERATE (A) None    Mucus, UA 1+ (A) None    Other Observations UA (A) NOT REQ. Utilizing a urinalysis as the only screening method to exclude a potential uropathogen can be unreliable in many patient populations. Rapid screening tests are less sensitive than culture and if UTI is a clinical possibility, culture should be considered despite a negative urinalysis. Imaging/Diagnostics:  No results found.     Assessment :      Hospital Problems             Last Modified POA    * (Principal) Acute renal failure (ARF) ([address])

## 2023-09-22 NOTE — PROGRESS NOTES
Patient refusing vital signs and becoming combative with staff when attempting to obtain vitals. Physician notified.

## 2023-09-23 LAB
ANION GAP SERPL CALCULATED.3IONS-SCNC: 8 MMOL/L (ref 9–17)
BASOPHILS # BLD: 0.1 K/UL (ref 0–0.2)
BASOPHILS NFR BLD: 1 % (ref 0–2)
BUN SERPL-MCNC: 51 MG/DL (ref 8–23)
CALCIUM SERPL-MCNC: 8.3 MG/DL (ref 8.6–10.4)
CHLORIDE SERPL-SCNC: 113 MMOL/L (ref 98–107)
CO2 SERPL-SCNC: 23 MMOL/L (ref 20–31)
CREAT SERPL-MCNC: 2 MG/DL (ref 0.7–1.2)
EOSINOPHIL # BLD: 0.1 K/UL (ref 0–0.4)
EOSINOPHILS RELATIVE PERCENT: 2 % (ref 1–4)
ERYTHROCYTE [DISTWIDTH] IN BLOOD BY AUTOMATED COUNT: 15.4 % (ref 12.5–15.4)
GFR SERPL CREATININE-BSD FRML MDRD: 31 ML/MIN/1.73M2
GLUCOSE BLD-MCNC: 65 MG/DL (ref 75–110)
GLUCOSE BLD-MCNC: 67 MG/DL (ref 75–110)
GLUCOSE BLD-MCNC: 71 MG/DL (ref 75–110)
GLUCOSE BLD-MCNC: 78 MG/DL (ref 75–110)
GLUCOSE SERPL-MCNC: 72 MG/DL (ref 70–99)
HCT VFR BLD AUTO: 34.6 % (ref 41–53)
HGB BLD-MCNC: 11.8 G/DL (ref 13.5–17.5)
LYMPHOCYTES NFR BLD: 0.8 K/UL (ref 1–4.8)
LYMPHOCYTES RELATIVE PERCENT: 15 % (ref 24–44)
MAGNESIUM SERPL-MCNC: 2.2 MG/DL (ref 1.6–2.6)
MCH RBC QN AUTO: 33.6 PG (ref 26–34)
MCHC RBC AUTO-ENTMCNC: 34.1 G/DL (ref 31–37)
MCV RBC AUTO: 98.5 FL (ref 80–100)
MONOCYTES NFR BLD: 0.4 K/UL (ref 0.1–1.2)
MONOCYTES NFR BLD: 8 % (ref 2–11)
NEUTROPHILS NFR BLD: 74 % (ref 36–66)
NEUTS SEG NFR BLD: 4.2 K/UL (ref 1.8–7.7)
PLATELET # BLD AUTO: 229 K/UL (ref 140–450)
PMV BLD AUTO: 8.2 FL (ref 6–12)
POTASSIUM SERPL-SCNC: 3.2 MMOL/L (ref 3.7–5.3)
RBC # BLD AUTO: 3.51 M/UL (ref 4.5–5.9)
SODIUM SERPL-SCNC: 144 MMOL/L (ref 135–144)
WBC OTHER # BLD: 5.6 K/UL (ref 3.5–11)

## 2023-09-23 PROCEDURE — 2580000003 HC RX 258: Performed by: STUDENT IN AN ORGANIZED HEALTH CARE EDUCATION/TRAINING PROGRAM

## 2023-09-23 PROCEDURE — 36415 COLL VENOUS BLD VENIPUNCTURE: CPT

## 2023-09-23 PROCEDURE — 82947 ASSAY GLUCOSE BLOOD QUANT: CPT

## 2023-09-23 PROCEDURE — 2060000000 HC ICU INTERMEDIATE R&B

## 2023-09-23 PROCEDURE — 80048 BASIC METABOLIC PNL TOTAL CA: CPT

## 2023-09-23 PROCEDURE — 85025 COMPLETE CBC W/AUTO DIFF WBC: CPT

## 2023-09-23 PROCEDURE — 6360000002 HC RX W HCPCS: Performed by: STUDENT IN AN ORGANIZED HEALTH CARE EDUCATION/TRAINING PROGRAM

## 2023-09-23 PROCEDURE — 83735 ASSAY OF MAGNESIUM: CPT

## 2023-09-23 PROCEDURE — 6370000000 HC RX 637 (ALT 250 FOR IP): Performed by: STUDENT IN AN ORGANIZED HEALTH CARE EDUCATION/TRAINING PROGRAM

## 2023-09-23 PROCEDURE — 99232 SBSQ HOSP IP/OBS MODERATE 35: CPT | Performed by: STUDENT IN AN ORGANIZED HEALTH CARE EDUCATION/TRAINING PROGRAM

## 2023-09-23 RX ADMIN — HEPARIN SODIUM 5000 UNITS: 5000 INJECTION INTRAVENOUS; SUBCUTANEOUS at 22:41

## 2023-09-23 RX ADMIN — QUETIAPINE FUMARATE 50 MG: 25 TABLET ORAL at 22:41

## 2023-09-23 RX ADMIN — HEPARIN SODIUM 5000 UNITS: 5000 INJECTION INTRAVENOUS; SUBCUTANEOUS at 06:06

## 2023-09-23 RX ADMIN — CEFTRIAXONE SODIUM 1000 MG: 1 INJECTION, POWDER, FOR SOLUTION INTRAMUSCULAR; INTRAVENOUS at 16:15

## 2023-09-23 RX ADMIN — SODIUM CHLORIDE, PRESERVATIVE FREE 10 ML: 5 INJECTION INTRAVENOUS at 22:49

## 2023-09-23 NOTE — PLAN OF CARE
Problem: Discharge Planning  Goal: Discharge to home or other facility with appropriate resources  Outcome: Progressing  Flowsheets (Taken 9/22/2023 2000)  Discharge to home or other facility with appropriate resources:   Identify barriers to discharge with patient and caregiver   Arrange for needed discharge resources and transportation as appropriate   Identify discharge learning needs (meds, wound care, etc)   Arrange for interpreters to assist at discharge as needed   Refer to discharge planning if patient needs post-hospital services based on physician order or complex needs related to functional status, cognitive ability or social support system     Problem: Pain  Goal: Verbalizes/displays adequate comfort level or baseline comfort level  Outcome: Progressing     Problem: Skin/Tissue Integrity  Goal: Absence of new skin breakdown  Description: 1. Monitor for areas of redness and/or skin breakdown  2. Assess vascular access sites hourly  3. Every 4-6 hours minimum:  Change oxygen saturation probe site  4. Every 4-6 hours:  If on nasal continuous positive airway pressure, respiratory therapy assess nares and determine need for appliance change or resting period.   Outcome: Progressing     Problem: Safety - Adult  Goal: Free from fall injury  Outcome: Progressing     Problem: ABCDS Injury Assessment  Goal: Absence of physical injury  Outcome: Progressing

## 2023-09-23 NOTE — PROGRESS NOTES
[facility]  Office: [phone]  Janet Hannon, DO, Rafi Sumner, DO, Haleigh Stratton, DO, Dora Goodwin MD, Silvano Burr MD, Karol Tanner MD, Arcelia Libman, MD,  Dominga Victor MD, Nandini Moore MD, Dov Fofana DO, Bora Camarillo MD,  Filiberto Burnett MD, Jennifer Albarran MD, Onofre Owens DO, Yokasta Byrnes MD,  Yaquelin Villegas DO, Kem Batres MD, Roselyn Camejo MD, Radha Hunter MD, Helen Portillo MD,  Benigno Winchester MD, Kellie Arteaga MD, Lisa Jasmine MD, Syed Meléndez MD, Marian Simms DO, Laurie Singh MD,  Neftali Moncada MD, Alonso Vanessa MD, Baron Adrian, CNP,  Liudmila Velasquez, CNP,, Rosita Bynum, CNP,  Carie Christianson, DNP, Augusto Greer, CNP, Maria Dover, CNP, Shoaib Benz, CNP, Aaron Medel, CNP, Mason Fong, CNP, Adrienne Echevarria, CNP, Angelica Vitale, CNS, Noa Rey, CNP, Lexi Peterson, [address] [place] [address]    Progress Note    9/23/2023    8:36 AM    Name:   Akua Crane  MRN:     [id number]     Acct:      [de-identified]   Room:   [address] [place] [address] Day:  1  Admit Date:  9/22/2023 12:23 PM    PCP:   No primary care provider on file. Code Status:  DNR-CC    Subjective:     Patient was seen and examined at bedside this AM. He remains altered and is unable to respond to questioning. Creatinine has dramatically improved with fluids/canseco catheter placement. Plan for family meeting with Hospice today. Medications:      Allergies:  No Known Allergies    Current Meds:   Scheduled Meds:    QUEtiapine  50 mg Oral Nightly    cefTRIAXone (ROCEPHIN) IV  1,000 mg IntraVENous Q24H    sodium chloride flush  5-40 mL IntraVENous 2 times per day    heparin (porcine)  5,000 Units SubCUTAneous 3 times per day     Continuous Infusions:    sodium chloride 100 mL/hr at 09/22/23 2346    sodium chloride       PRN Meds: sodium chloride flush, sodium chloride, ondansetron **OR** ondansetron,

## 2023-09-23 NOTE — PLAN OF CARE
Problem: Discharge Planning  Goal: Discharge to home or other facility with appropriate resources  9/23/2023 1242 by Sandra Ibarra RN  Outcome: Progressing  9/23/2023 0256 by Vinny Salgado RN  Outcome: Progressing  Flowsheets (Taken 9/22/2023 2000)  Discharge to home or other facility with appropriate resources:   Identify barriers to discharge with patient and caregiver   Arrange for needed discharge resources and transportation as appropriate   Identify discharge learning needs (meds, wound care, etc)   Arrange for interpreters to assist at discharge as needed   Refer to discharge planning if patient needs post-hospital services based on physician order or complex needs related to functional status, cognitive ability or social support system     Problem: Pain  Goal: Verbalizes/displays adequate comfort level or baseline comfort level  9/23/2023 1242 by Sandra Ibarra RN  Outcome: Progressing  9/23/2023 0256 by Vinny Salgado RN  Outcome: Progressing     Problem: Skin/Tissue Integrity  Goal: Absence of new skin breakdown  Description: 1. Monitor for areas of redness and/or skin breakdown  2. Assess vascular access sites hourly  3. Every 4-6 hours minimum:  Change oxygen saturation probe site  4. Every 4-6 hours:  If on nasal continuous positive airway pressure, respiratory therapy assess nares and determine need for appliance change or resting period.   9/23/2023 1242 by Sandra Ibarra RN  Outcome: Progressing  9/23/2023 0256 by Vinny Salgado RN  Outcome: Progressing     Problem: Safety - Adult  Goal: Free from fall injury  9/23/2023 1242 by Sandra Ibarra RN  Outcome: Progressing  9/23/2023 0256 by Vinny Salgado RN  Outcome: Progressing     Problem: ABCDS Injury Assessment  Goal: Absence of physical injury  9/23/2023 1242 by Sandra Ibarra RN  Outcome: Progressing  9/23/2023 0256 by Vinny Salgado RN  Outcome: Progressing

## 2023-09-23 NOTE — PROGRESS NOTES
Physical Therapy                                                      Physical Therapy Cancel Note      DATE: 2023    NAME: Tara Sutton  MRN: [id number]   : 1934      Patient not seen this date for Physical Therapy due to: Other: RN defer PT evaluation this morning as pt meeting with hospice currently. PLAN: continue to pursue PT evaluation as able, if pt doesn't transition to hospice.        Electronically signed by Mary Villa PT on 2023 at 10:20 AM

## 2023-09-23 NOTE — PROGRESS NOTES
Occupational [address]  Occupational Therapy Not Seen Note    DATE: 2023    NAME: Dale Baker  MRN: [id number]   : 1934      Patient not seen this date for Occupational Therapy due to: Other:  RN defer OT evaluation @ 1020 this morning as pt meeting with hospice currently.      Next Scheduled Treatment:     Electronically signed by Agnieszka Rivas on 2023 at 11:50 AM

## 2023-09-24 LAB
ANION GAP SERPL CALCULATED.3IONS-SCNC: 7 MMOL/L (ref 9–17)
BASOPHILS # BLD: 0 K/UL (ref 0–0.2)
BASOPHILS NFR BLD: 0 % (ref 0–2)
BUN SERPL-MCNC: 23 MG/DL (ref 8–23)
CALCIUM SERPL-MCNC: 8.3 MG/DL (ref 8.6–10.4)
CHLORIDE SERPL-SCNC: 113 MMOL/L (ref 98–107)
CO2 SERPL-SCNC: 24 MMOL/L (ref 20–31)
CREAT SERPL-MCNC: 0.9 MG/DL (ref 0.7–1.2)
EOSINOPHIL # BLD: 0.1 K/UL (ref 0–0.4)
EOSINOPHILS RELATIVE PERCENT: 2 % (ref 1–4)
ERYTHROCYTE [DISTWIDTH] IN BLOOD BY AUTOMATED COUNT: 15.6 % (ref 12.5–15.4)
GFR SERPL CREATININE-BSD FRML MDRD: >60 ML/MIN/1.73M2
GLUCOSE BLD-MCNC: 73 MG/DL (ref 75–110)
GLUCOSE BLD-MCNC: 83 MG/DL (ref 75–110)
GLUCOSE BLD-MCNC: 86 MG/DL (ref 75–110)
GLUCOSE BLD-MCNC: 94 MG/DL (ref 75–110)
GLUCOSE SERPL-MCNC: 88 MG/DL (ref 70–99)
HCT VFR BLD AUTO: 35.3 % (ref 41–53)
HGB BLD-MCNC: 12 G/DL (ref 13.5–17.5)
LYMPHOCYTES NFR BLD: 1.1 K/UL (ref 1–4.8)
LYMPHOCYTES RELATIVE PERCENT: 18 % (ref 24–44)
MAGNESIUM SERPL-MCNC: 1.9 MG/DL (ref 1.6–2.6)
MCH RBC QN AUTO: 33.7 PG (ref 26–34)
MCHC RBC AUTO-ENTMCNC: 33.9 G/DL (ref 31–37)
MCV RBC AUTO: 99.4 FL (ref 80–100)
MONOCYTES NFR BLD: 0.6 K/UL (ref 0.1–1.2)
MONOCYTES NFR BLD: 9 % (ref 2–11)
NEUTROPHILS NFR BLD: 71 % (ref 36–66)
NEUTS SEG NFR BLD: 4.4 K/UL (ref 1.8–7.7)
PLATELET # BLD AUTO: 257 K/UL (ref 140–450)
PMV BLD AUTO: 7.8 FL (ref 6–12)
POTASSIUM SERPL-SCNC: 3.4 MMOL/L (ref 3.7–5.3)
RBC # BLD AUTO: 3.55 M/UL (ref 4.5–5.9)
SODIUM SERPL-SCNC: 144 MMOL/L (ref 135–144)
WBC OTHER # BLD: 6.3 K/UL (ref 3.5–11)

## 2023-09-24 PROCEDURE — 97116 GAIT TRAINING THERAPY: CPT

## 2023-09-24 PROCEDURE — 85025 COMPLETE CBC W/AUTO DIFF WBC: CPT

## 2023-09-24 PROCEDURE — 6370000000 HC RX 637 (ALT 250 FOR IP): Performed by: STUDENT IN AN ORGANIZED HEALTH CARE EDUCATION/TRAINING PROGRAM

## 2023-09-24 PROCEDURE — 97162 PT EVAL MOD COMPLEX 30 MIN: CPT

## 2023-09-24 PROCEDURE — 83735 ASSAY OF MAGNESIUM: CPT

## 2023-09-24 PROCEDURE — 6360000002 HC RX W HCPCS: Performed by: STUDENT IN AN ORGANIZED HEALTH CARE EDUCATION/TRAINING PROGRAM

## 2023-09-24 PROCEDURE — 36415 COLL VENOUS BLD VENIPUNCTURE: CPT

## 2023-09-24 PROCEDURE — 82947 ASSAY GLUCOSE BLOOD QUANT: CPT

## 2023-09-24 PROCEDURE — 2580000003 HC RX 258: Performed by: STUDENT IN AN ORGANIZED HEALTH CARE EDUCATION/TRAINING PROGRAM

## 2023-09-24 PROCEDURE — 99232 SBSQ HOSP IP/OBS MODERATE 35: CPT | Performed by: STUDENT IN AN ORGANIZED HEALTH CARE EDUCATION/TRAINING PROGRAM

## 2023-09-24 PROCEDURE — 2060000000 HC ICU INTERMEDIATE R&B

## 2023-09-24 PROCEDURE — 80048 BASIC METABOLIC PNL TOTAL CA: CPT

## 2023-09-24 RX ORDER — POTASSIUM CHLORIDE 7.45 MG/ML
10 INJECTION INTRAVENOUS PRN
Status: DISCONTINUED | OUTPATIENT
Start: 2023-09-24 | End: 2023-10-06 | Stop reason: HOSPADM

## 2023-09-24 RX ORDER — POTASSIUM CHLORIDE 20 MEQ/1
40 TABLET, EXTENDED RELEASE ORAL PRN
Status: DISCONTINUED | OUTPATIENT
Start: 2023-09-24 | End: 2023-10-06 | Stop reason: HOSPADM

## 2023-09-24 RX ADMIN — SODIUM CHLORIDE, PRESERVATIVE FREE 10 ML: 5 INJECTION INTRAVENOUS at 08:21

## 2023-09-24 RX ADMIN — SODIUM CHLORIDE, PRESERVATIVE FREE 10 ML: 5 INJECTION INTRAVENOUS at 21:43

## 2023-09-24 RX ADMIN — CEFTRIAXONE SODIUM 1000 MG: 1 INJECTION, POWDER, FOR SOLUTION INTRAMUSCULAR; INTRAVENOUS at 17:12

## 2023-09-24 RX ADMIN — LORAZEPAM 1 MG: 2 INJECTION INTRAMUSCULAR; INTRAVENOUS at 21:48

## 2023-09-24 RX ADMIN — HEPARIN SODIUM 5000 UNITS: 5000 INJECTION INTRAVENOUS; SUBCUTANEOUS at 21:44

## 2023-09-24 RX ADMIN — QUETIAPINE FUMARATE 50 MG: 25 TABLET ORAL at 21:43

## 2023-09-24 RX ADMIN — HEPARIN SODIUM 5000 UNITS: 5000 INJECTION INTRAVENOUS; SUBCUTANEOUS at 06:03

## 2023-09-24 NOTE — PLAN OF CARE
Problem: Discharge Planning  Goal: Discharge to home or other facility with appropriate resources  9/24/2023 0117 by Yazan Treadwell RN  Outcome: Progressing  Flowsheets (Taken 9/23/2023 2000)  Discharge to home or other facility with appropriate resources:   Identify barriers to discharge with patient and caregiver   Arrange for needed discharge resources and transportation as appropriate   Identify discharge learning needs (meds, wound care, etc)   Arrange for interpreters to assist at discharge as needed   Refer to discharge planning if patient needs post-hospital services based on physician order or complex needs related to functional status, cognitive ability or social support system     Problem: Pain  Goal: Verbalizes/displays adequate comfort level or baseline comfort level  9/24/2023 0117 by Yazan Treadwell RN  Outcome: Progressing  Flowsheets (Taken 9/23/2023 1958)  Verbalizes/displays adequate comfort level or baseline comfort level:   Encourage patient to monitor pain and request assistance   Assess pain using appropriate pain scale   Administer analgesics based on type and severity of pain and evaluate response   Implement non-pharmacological measures as appropriate and evaluate response   Consider cultural and social influences on pain and pain management   Notify Licensed Independent Practitioner if interventions unsuccessful or patient reports new pain     Problem: Skin/Tissue Integrity  Goal: Absence of new skin breakdown  Description: 1. Monitor for areas of redness and/or skin breakdown  2. Assess vascular access sites hourly  3. Every 4-6 hours minimum:  Change oxygen saturation probe site  4. Every 4-6 hours:  If on nasal continuous positive airway pressure, respiratory therapy assess nares and determine need for appliance change or resting period.   9/24/2023 0117 by Yazan Treadwell RN  Outcome: Progressing     Problem: Safety - Adult  Goal: Free from fall injury  9/24/2023 0117 by Yazan Treadwell

## 2023-09-24 NOTE — PLAN OF CARE
Problem: Discharge Planning  Goal: Discharge to home or other facility with appropriate resources  9/24/2023 1038 by Bill Hayward RN  Outcome: Progressing  9/24/2023 0117 by Agustín Berumen RN  Outcome: Lisa Rossi (Taken 9/23/2023 2000)  Discharge to home or other facility with appropriate resources:   Identify barriers to discharge with patient and caregiver   Arrange for needed discharge resources and transportation as appropriate   Identify discharge learning needs (meds, wound care, etc)   Arrange for interpreters to assist at discharge as needed   Refer to discharge planning if patient needs post-hospital services based on physician order or complex needs related to functional status, cognitive ability or social support system     Problem: Pain  Goal: Verbalizes/displays adequate comfort level or baseline comfort level  9/24/2023 1038 by Bill Hayward RN  Outcome: Progressing  9/24/2023 0117 by Agustín Berumen RN  Outcome: Progressing  Flowsheets (Taken 9/23/2023 1958)  Verbalizes/displays adequate comfort level or baseline comfort level:   Encourage patient to monitor pain and request assistance   Assess pain using appropriate pain scale   Administer analgesics based on type and severity of pain and evaluate response   Implement non-pharmacological measures as appropriate and evaluate response   Consider cultural and social influences on pain and pain management   Notify Licensed Independent Practitioner if interventions unsuccessful or patient reports new pain     Problem: Skin/Tissue Integrity  Goal: Absence of new skin breakdown  Description: 1. Monitor for areas of redness and/or skin breakdown  2. Assess vascular access sites hourly  3. Every 4-6 hours minimum:  Change oxygen saturation probe site  4. Every 4-6 hours:  If on nasal continuous positive airway pressure, respiratory therapy assess nares and determine need for appliance change or resting period.   9/24/2023 1038 by Maxime Hubbard

## 2023-09-24 NOTE — PROGRESS NOTES
[facility]  Office: [phone]  Jigna Salguero DO, Bety Chandler DO, Brooklyn Stratton DO, Manuel Vail MD, Romulo Henderson MD, Gunnar Diaz MD, Mine Ernst MD,  Gema Nava MD, Ruba Livingston MD, Shaw Keita DO, Felice Mosie MD,  Marcie Dawson MD, Carmencita Blackmon MD, Charlesetta Burkitt, DO, Aaron Ruelas MD,  Nisha Colorado MD, Jerrell Morales MD, Markie Barrios MD, Madalyn Son MD,  Yong Bamberger, MD, Theresa Small MD, Thelma Liu MD, Jorge L Liu MD, Tamiko Martinez DO, Rhonda Ludwig MD,  Salvatore Hamilton MD, Mayank Coppola MD, Sky Abdi, CNP,  Erik Curiel, CNP,, Jackie Vegas, CNP,  Abraham Figueroa, DNP, Alycia Donahue CNP, Laurel Serrato, CNP, Laura Triana, CNP, Ragena Rubinstein, CNP, Tana Parker, CNP, Chad Leary, CNP, Khanh Flanagan, CNS, Douglas Trevizo, CNP, Karma Mendoza[address]    Progress Note    9/24/2023    9:48 AM    Name:   David Cho  MRN:     [id number]     Acct:      [de-identified]   Room:   [address] [place] [address] Day:  2  Admit Date:  9/22/2023 12:23 PM    PCP:   No primary care provider on file. Code Status:  DNR-CC    Subjective:     Patient was seen and examined at bedside this AM. He has significantly improved over the past 24-hours and his mentation has returned to baseline (patient is alert and oriented only to self). Awaiting SNF placement with Hospice services. Medications:      Allergies:  No Known Allergies    Current Meds:   Scheduled Meds:    QUEtiapine  50 mg Oral Nightly    cefTRIAXone (ROCEPHIN) IV  1,000 mg IntraVENous Q24H    sodium chloride flush  5-40 mL IntraVENous 2 times per day    heparin (porcine)  5,000 Units SubCUTAneous 3 times per day     Continuous Infusions:    sodium chloride 100 mL/hr at 09/22/23 2346    sodium chloride       PRN Meds: sodium chloride flush, sodium chloride, ondansetron **OR**

## 2023-09-24 NOTE — PROGRESS NOTES
Physical Therapy  Facility/Department: [facility] SURG ICU  Physical Therapy Initial Assessment    Name: Shruthi Estrada  : 1934  MRN: [id number]  Date of Service: 2023  Chief Complaint   Patient presents with    Abnormal Labs        Discharge Recommendations:  Patient would benefit from continued therapy after discharge          Patient Diagnosis(es): The primary encounter diagnosis was Acute urinary retention. Diagnoses of Acute renal failure, unspecified acute renal failure type ([address]) and Urinary tract infection without hematuria, site unspecified were also pertinent to this visit. Past Medical History:  has a past medical history of Arthritis, Arthropathy, unspecified, site unspecified, Coronary atherosclerosis of unspecified type of vessel, native or graft, Other and unspecified hyperlipidemia, and Unspecified essential hypertension. Past Surgical History:  has a past surgical history that includes Knee arthroscopy; Coronary angioplasty with stent; Total hip arthroplasty; and Total shoulder arthroplasty (Right). Assessment   Body Structures, Functions, Activity Limitations Requiring Skilled Therapeutic Intervention: Decreased functional mobility ; Decreased safe awareness;Decreased balance;Decreased posture;Decreased cognition  Assessment: Pt was living at home until early September and was discharged to [address] [place] [address] 23. He currently requires mod A bed mobility, min A transfers and gait with RW and 40ft. His impaired cognition limits his balance reactions and he is high fall risk. Pt requires 24hr assist and would benefit from continued therapy. He is appropriate to go back to [facility].   Therapy Prognosis: Fair  Decision Making: Medium Complexity  Requires PT Follow-Up: Yes  Activity Tolerance  Activity Tolerance: Treatment limited secondary to decreased cognition     Plan   Physcial Therapy Plan  General Plan: 3-5 times per week  Current Treatment Recommendations: Balance training,

## 2023-09-25 LAB
ANION GAP SERPL CALCULATED.3IONS-SCNC: 8 MMOL/L (ref 9–17)
BASOPHILS # BLD: 0 K/UL (ref 0–0.2)
BASOPHILS NFR BLD: 1 % (ref 0–2)
BUN SERPL-MCNC: 16 MG/DL (ref 8–23)
CALCIUM SERPL-MCNC: 8.3 MG/DL (ref 8.6–10.4)
CHLORIDE SERPL-SCNC: 112 MMOL/L (ref 98–107)
CO2 SERPL-SCNC: 24 MMOL/L (ref 20–31)
CREAT SERPL-MCNC: 0.7 MG/DL (ref 0.7–1.2)
EOSINOPHIL # BLD: 0.2 K/UL (ref 0–0.4)
EOSINOPHILS RELATIVE PERCENT: 3 % (ref 1–4)
ERYTHROCYTE [DISTWIDTH] IN BLOOD BY AUTOMATED COUNT: 15.7 % (ref 12.5–15.4)
GFR SERPL CREATININE-BSD FRML MDRD: >60 ML/MIN/1.73M2
GLUCOSE BLD-MCNC: 148 MG/DL (ref 75–110)
GLUCOSE BLD-MCNC: 169 MG/DL (ref 75–110)
GLUCOSE BLD-MCNC: 71 MG/DL (ref 75–110)
GLUCOSE BLD-MCNC: 75 MG/DL (ref 75–110)
GLUCOSE SERPL-MCNC: 81 MG/DL (ref 70–99)
HCT VFR BLD AUTO: 37.9 % (ref 41–53)
HGB BLD-MCNC: 12.6 G/DL (ref 13.5–17.5)
LYMPHOCYTES NFR BLD: 1.1 K/UL (ref 1–4.8)
LYMPHOCYTES RELATIVE PERCENT: 18 % (ref 24–44)
MCH RBC QN AUTO: 33.3 PG (ref 26–34)
MCHC RBC AUTO-ENTMCNC: 33.1 G/DL (ref 31–37)
MCV RBC AUTO: 100.6 FL (ref 80–100)
MONOCYTES NFR BLD: 0.6 K/UL (ref 0.1–1.2)
MONOCYTES NFR BLD: 11 % (ref 2–11)
NEUTROPHILS NFR BLD: 67 % (ref 36–66)
NEUTS SEG NFR BLD: 4.1 K/UL (ref 1.8–7.7)
PLATELET # BLD AUTO: 296 K/UL (ref 140–450)
PMV BLD AUTO: 8.1 FL (ref 6–12)
POTASSIUM SERPL-SCNC: 3.6 MMOL/L (ref 3.7–5.3)
RBC # BLD AUTO: 3.77 M/UL (ref 4.5–5.9)
SODIUM SERPL-SCNC: 144 MMOL/L (ref 135–144)
WBC OTHER # BLD: 6.1 K/UL (ref 3.5–11)

## 2023-09-25 PROCEDURE — 6360000002 HC RX W HCPCS: Performed by: STUDENT IN AN ORGANIZED HEALTH CARE EDUCATION/TRAINING PROGRAM

## 2023-09-25 PROCEDURE — 2580000003 HC RX 258: Performed by: STUDENT IN AN ORGANIZED HEALTH CARE EDUCATION/TRAINING PROGRAM

## 2023-09-25 PROCEDURE — 2060000000 HC ICU INTERMEDIATE R&B

## 2023-09-25 PROCEDURE — 6370000000 HC RX 637 (ALT 250 FOR IP): Performed by: STUDENT IN AN ORGANIZED HEALTH CARE EDUCATION/TRAINING PROGRAM

## 2023-09-25 PROCEDURE — 82947 ASSAY GLUCOSE BLOOD QUANT: CPT

## 2023-09-25 PROCEDURE — 80048 BASIC METABOLIC PNL TOTAL CA: CPT

## 2023-09-25 PROCEDURE — 97530 THERAPEUTIC ACTIVITIES: CPT

## 2023-09-25 PROCEDURE — 36415 COLL VENOUS BLD VENIPUNCTURE: CPT

## 2023-09-25 PROCEDURE — 85025 COMPLETE CBC W/AUTO DIFF WBC: CPT

## 2023-09-25 PROCEDURE — 99232 SBSQ HOSP IP/OBS MODERATE 35: CPT | Performed by: STUDENT IN AN ORGANIZED HEALTH CARE EDUCATION/TRAINING PROGRAM

## 2023-09-25 RX ORDER — TAMSULOSIN HYDROCHLORIDE 0.4 MG/1
0.4 CAPSULE ORAL DAILY
Qty: 90 CAPSULE | Refills: 1 | DISCHARGE
Start: 2023-09-25

## 2023-09-25 RX ADMIN — HEPARIN SODIUM 5000 UNITS: 5000 INJECTION INTRAVENOUS; SUBCUTANEOUS at 21:52

## 2023-09-25 RX ADMIN — SODIUM CHLORIDE: 9 INJECTION, SOLUTION INTRAVENOUS at 23:52

## 2023-09-25 RX ADMIN — QUETIAPINE FUMARATE 50 MG: 25 TABLET ORAL at 21:52

## 2023-09-25 RX ADMIN — LORAZEPAM 1 MG: 2 INJECTION INTRAMUSCULAR; INTRAVENOUS at 22:06

## 2023-09-25 RX ADMIN — CEFTRIAXONE SODIUM 1000 MG: 1 INJECTION, POWDER, FOR SOLUTION INTRAMUSCULAR; INTRAVENOUS at 15:46

## 2023-09-25 RX ADMIN — SODIUM CHLORIDE: 9 INJECTION, SOLUTION INTRAVENOUS at 21:54

## 2023-09-25 RX ADMIN — HEPARIN SODIUM 5000 UNITS: 5000 INJECTION INTRAVENOUS; SUBCUTANEOUS at 15:46

## 2023-09-25 RX ADMIN — SODIUM CHLORIDE, PRESERVATIVE FREE 10 ML: 5 INJECTION INTRAVENOUS at 15:46

## 2023-09-25 RX ADMIN — HEPARIN SODIUM 5000 UNITS: 5000 INJECTION INTRAVENOUS; SUBCUTANEOUS at 05:33

## 2023-09-25 RX ADMIN — SODIUM CHLORIDE, PRESERVATIVE FREE 10 ML: 5 INJECTION INTRAVENOUS at 21:52

## 2023-09-25 NOTE — PROGRESS NOTES
Physician Progress Note      PATIENT:               America Deleon  CSN #:                  [id number]  :                       1934  ADMIT DATE:       2023 12:23 PM  [facility] DATE:  RESPONDING  PROVIDER #:        Connie Prescott MD          QUERY TEXT:    Pt admitted with MAIRA. Pt noted to have confusion/AMS. If possible, please   document in the progress notes and discharge summary if you are evaluating and   / or treating any of the following: The medical record reflects the following:  Risk Factors: MAIRA  Clinical Indicators: per ED notes \"pt was admitted earlier this month for UTI,   the family reports that the patient has been continuing to decline since   discharge worsening mentation/increased confusion, MAIRA with creatinine of 7.1,   per IM progress notes \"he is lethargic, remains altered and is unable to   respond to questioning,  significantly improved over the past 24-hours   with fluids/canseco catheter placement and his mentation has returned to   baseline\"  Treatment: IV fluids, Rocephin, canseco placement, labs, monitoring    Thank you, Brennan Calabrese, [address] [place] [address]  [email or web address]  office hours [phone]  Options provided:  -- Metabolic encephalopathy  -- Toxic metabolic encephalopathy  -- Other - I will add my own diagnosis  -- Disagree - Not applicable / Not valid  -- Disagree - Clinically unable to determine / Unknown  -- Refer to Clinical Documentation Reviewer    PROVIDER RESPONSE TEXT:    This patient has metabolic encephalopathy.     Query created by: Glenn Piña on 2023 9:36 AM      Electronically signed by:  Connie Prescott MD 2023 9:41 AM

## 2023-09-25 NOTE — FLOWSHEET NOTE
Updated patient's son Catie Redding on patient's change of condition. Per Catie Redding his son will be up shortly but he is ok with hospice re-evaluating for inpatient hospice. Raf Gagnon with hospice.

## 2023-09-25 NOTE — PLAN OF CARE
Problem: Discharge Planning  Goal: Discharge to home or other facility with appropriate resources  Outcome: Not Progressing     Problem: Pain  Goal: Verbalizes/displays adequate comfort level or baseline comfort level  Outcome: Not Progressing     Problem: Skin/Tissue Integrity  Goal: Absence of new skin breakdown  Description: 1. Monitor for areas of redness and/or skin breakdown  2. Assess vascular access sites hourly  3. Every 4-6 hours minimum:  Change oxygen saturation probe site  4. Every 4-6 hours:  If on nasal continuous positive airway pressure, respiratory therapy assess nares and determine need for appliance change or resting period. Outcome: Not Progressing     Problem: Safety - Adult  Goal: Free from fall injury  Outcome: Not Progressing  Flowsheets (Taken 9/25/2023 1119)  Free From Fall Injury: Instruct family/caregiver on patient safety     Problem: ABCDS Injury Assessment  Goal: Absence of physical injury  Outcome: Not Progressing  Flowsheets (Taken 9/25/2023 1119)  Absence of Physical Injury: Implement safety measures based on patient assessment     Problem: Discharge Planning  Goal: Discharge to home or other facility with appropriate resources  Outcome: Not Progressing     Problem: Pain  Goal: Verbalizes/displays adequate comfort level or baseline comfort level  Outcome: Not Progressing     Problem: Skin/Tissue Integrity  Goal: Absence of new skin breakdown  Description: 1. Monitor for areas of redness and/or skin breakdown  2. Assess vascular access sites hourly  3. Every 4-6 hours minimum:  Change oxygen saturation probe site  4. Every 4-6 hours:  If on nasal continuous positive airway pressure, respiratory therapy assess nares and determine need for appliance change or resting period.   Outcome: Not Progressing     Problem: Safety - Adult  Goal: Free from fall injury  Outcome: Not Progressing  Flowsheets (Taken 9/25/2023 1119)  Free From Fall Injury: Instruct family/caregiver on

## 2023-09-25 NOTE — PROGRESS NOTES
Occupational [address]  Occupational Therapy Not Seen Note    DATE: 2023    NAME: Drea June  MRN: [id number]   : 1934      Patient not seen this date for Occupational Therapy due to:     Other: Pt difficult to arouse in AM; will continue to follow for OT eval as appropriate     Next Scheduled Treatment:  PM or 23    Electronically signed by Inge Yanes OT on 2023 at 9:08 AM

## 2023-09-25 NOTE — CARE COORDINATION
Received medicaid application from [employer].  Faxed application to [id number] [facility] [employer]

## 2023-09-25 NOTE — PROGRESS NOTES
Physical Therapy  Facility/Department: [facility] ICU  Physical Therapy Daily Treatment Note    Name: Tish Zhao  : 1934  MRN: [id number]  Date of Service: 2023    Discharge Recommendations:  Patient would benefit from continued therapy after discharge          Patient Diagnosis(es): The primary encounter diagnosis was Acute urinary retention. Diagnoses of Acute renal failure, unspecified acute renal failure type ([address]) and Urinary tract infection without hematuria, site unspecified were also pertinent to this visit. Past Medical History:  has a past medical history of Arthritis, Arthropathy, unspecified, site unspecified, Coronary atherosclerosis of unspecified type of vessel, native or graft, Other and unspecified hyperlipidemia, and Unspecified essential hypertension. Past Surgical History:  has a past surgical history that includes Knee arthroscopy; Coronary angioplasty with stent; Total hip arthroplasty; and Total shoulder arthroplasty (Right). Assessment   Body Structures, Functions, Activity Limitations Requiring Skilled Therapeutic Intervention: Decreased functional mobility ; Decreased safe awareness;Decreased balance;Decreased posture;Decreased cognition    Assessment: Pt currently confused and agitated. Required max assist to initiate bed mobility, sat intermediate on side of bed for ~2 minutes with min assist. RN present, pt became agitated and no further transfers were attempted. Pt requires 24 hour assist and would benefit from continued therapy. Pt is appropriate for return to prior facility.     Requires PT Follow-Up: Yes    Activity Tolerance  Activity Tolerance: Treatment limited secondary to decreased cognition     Plan   Physcial Therapy Plan  General Plan: 3-5 times per week  Current Treatment Recommendations: Balance training, Functional mobility training, Transfer training, Gait training, Therapeutic activities, Cognitive reorientation, Endurance training, Safety

## 2023-09-25 NOTE — PLAN OF CARE
Problem: Discharge Planning  Goal: Discharge to home or other facility with appropriate resources  Outcome: Progressing  Flowsheets (Taken 9/24/2023 2000)  Discharge to home or other facility with appropriate resources:   Identify barriers to discharge with patient and caregiver   Arrange for needed discharge resources and transportation as appropriate   Identify discharge learning needs (meds, wound care, etc)   Arrange for interpreters to assist at discharge as needed   Refer to discharge planning if patient needs post-hospital services based on physician order or complex needs related to functional status, cognitive ability or social support system     Problem: Pain  Goal: Verbalizes/displays adequate comfort level or baseline comfort level  Outcome: Progressing  Flowsheets (Taken 9/24/2023 1944)  Verbalizes/displays adequate comfort level or baseline comfort level:   Encourage patient to monitor pain and request assistance   Assess pain using appropriate pain scale   Administer analgesics based on type and severity of pain and evaluate response   Implement non-pharmacological measures as appropriate and evaluate response   Notify Licensed Independent Practitioner if interventions unsuccessful or patient reports new pain     Problem: Skin/Tissue Integrity  Goal: Absence of new skin breakdown  Description: 1. Monitor for areas of redness and/or skin breakdown  2. Assess vascular access sites hourly  3. Every 4-6 hours minimum:  Change oxygen saturation probe site  4. Every 4-6 hours:  If on nasal continuous positive airway pressure, respiratory therapy assess nares and determine need for appliance change or resting period.   Outcome: Progressing     Problem: Safety - Adult  Goal: Free from fall injury  Outcome: Progressing     Problem: ABCDS Injury Assessment  Goal: Absence of physical injury  Outcome: Progressing

## 2023-09-25 NOTE — PROGRESS NOTES
[facility]  PROGRESS NOTE    Room # 290/359-54   Name: Jaquelin Kohler            Buddhist: non-Gnosticist      Reason for visit: Routine    I visited the family. Admit Date & Time: 9/22/2023 12:23 PM    Assessment:  Jaquelin Kohler is a 80 y.o. male in the hospital because \"renal failure\". Upon entering the room patient was lying in bed sleeping. Patient's grandson was standing bedside. Patient's grandson stated that patient has other family, including patient's son, who will be visiting patient this evening. Patient's grandson also stated that he spent time with patient today\"sharing memories\" Patient's grandson appeared to be calm and coping. Intervention:  I introduced myself and my title as  I offered space for patient's grandson  to express feelings, needs, and concerns and provided a ministry presence. This writer actively listened to patient's grandson. This writer also shared with patient's grandson that he is is available to support patient and family in any way that he can. Outcome:  Patient's grandson expressed gratitude to this writer for visiting     Plan:  Chaplains will remain available to offer spiritual and emotional support as needed. Electronically signed by Niki Crane on 9/25/2023 at 1:24 PM.  [address] [place] [address] Drive        09/25/23 1322   Encounter Summary   Encounter Overview/Reason  Initial Encounter   Service Provided For: Family   Referral/Consult From:  [address] Children;Family members   Last Encounter  09/25/23   Complexity of Encounter Low   Begin Time 1315   End Time  1323   Total Time Calculated 8 min   Spiritual/Emotional needs   Type Spiritual Support   Assessment/Intervention/Outcome   Assessment Calm;Coping   Intervention Active listening;Sustaining Presence/Ministry of presence; Explored/Affirmed feelings, thoughts, concerns   Outcome Engaged in conversation;Coping;Expressed Gratitude
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
## 2023-09-26 PROBLEM — R62.7 FAILURE TO THRIVE IN ADULT: Status: ACTIVE | Noted: 2023-09-26

## 2023-09-26 LAB
ANION GAP SERPL CALCULATED.3IONS-SCNC: 6 MMOL/L (ref 9–17)
BUN SERPL-MCNC: 12 MG/DL (ref 8–23)
CALCIUM SERPL-MCNC: 7.8 MG/DL (ref 8.6–10.4)
CHLORIDE SERPL-SCNC: 112 MMOL/L (ref 98–107)
CO2 SERPL-SCNC: 26 MMOL/L (ref 20–31)
CREAT SERPL-MCNC: 0.6 MG/DL (ref 0.7–1.2)
GFR SERPL CREATININE-BSD FRML MDRD: >60 ML/MIN/1.73M2
GLUCOSE BLD-MCNC: 101 MG/DL (ref 75–110)
GLUCOSE BLD-MCNC: 173 MG/DL (ref 75–110)
GLUCOSE BLD-MCNC: 83 MG/DL (ref 75–110)
GLUCOSE BLD-MCNC: 93 MG/DL (ref 75–110)
GLUCOSE SERPL-MCNC: 96 MG/DL (ref 70–99)
POTASSIUM SERPL-SCNC: 3.9 MMOL/L (ref 3.7–5.3)
SODIUM SERPL-SCNC: 144 MMOL/L (ref 135–144)

## 2023-09-26 PROCEDURE — 6370000000 HC RX 637 (ALT 250 FOR IP): Performed by: STUDENT IN AN ORGANIZED HEALTH CARE EDUCATION/TRAINING PROGRAM

## 2023-09-26 PROCEDURE — 80048 BASIC METABOLIC PNL TOTAL CA: CPT

## 2023-09-26 PROCEDURE — 36415 COLL VENOUS BLD VENIPUNCTURE: CPT

## 2023-09-26 PROCEDURE — 99232 SBSQ HOSP IP/OBS MODERATE 35: CPT | Performed by: STUDENT IN AN ORGANIZED HEALTH CARE EDUCATION/TRAINING PROGRAM

## 2023-09-26 PROCEDURE — 6360000002 HC RX W HCPCS: Performed by: STUDENT IN AN ORGANIZED HEALTH CARE EDUCATION/TRAINING PROGRAM

## 2023-09-26 PROCEDURE — 2060000000 HC ICU INTERMEDIATE R&B

## 2023-09-26 PROCEDURE — 82947 ASSAY GLUCOSE BLOOD QUANT: CPT

## 2023-09-26 PROCEDURE — 2580000003 HC RX 258: Performed by: STUDENT IN AN ORGANIZED HEALTH CARE EDUCATION/TRAINING PROGRAM

## 2023-09-26 RX ORDER — MIRTAZAPINE 15 MG/1
15 TABLET, FILM COATED ORAL NIGHTLY
Status: DISCONTINUED | OUTPATIENT
Start: 2023-09-26 | End: 2023-10-06 | Stop reason: HOSPADM

## 2023-09-26 RX ADMIN — CEFTRIAXONE SODIUM 1000 MG: 1 INJECTION, POWDER, FOR SOLUTION INTRAMUSCULAR; INTRAVENOUS at 15:49

## 2023-09-26 RX ADMIN — HEPARIN SODIUM 5000 UNITS: 5000 INJECTION INTRAVENOUS; SUBCUTANEOUS at 20:12

## 2023-09-26 RX ADMIN — MORPHINE SULFATE 2 MG: 2 INJECTION, SOLUTION INTRAMUSCULAR; INTRAVENOUS at 21:18

## 2023-09-26 RX ADMIN — QUETIAPINE FUMARATE 50 MG: 25 TABLET ORAL at 20:06

## 2023-09-26 RX ADMIN — HEPARIN SODIUM 5000 UNITS: 5000 INJECTION INTRAVENOUS; SUBCUTANEOUS at 05:44

## 2023-09-26 RX ADMIN — SODIUM CHLORIDE: 9 INJECTION, SOLUTION INTRAVENOUS at 08:39

## 2023-09-26 RX ADMIN — HEPARIN SODIUM 5000 UNITS: 5000 INJECTION INTRAVENOUS; SUBCUTANEOUS at 14:53

## 2023-09-26 RX ADMIN — SODIUM CHLORIDE, PRESERVATIVE FREE 10 ML: 5 INJECTION INTRAVENOUS at 20:12

## 2023-09-26 RX ADMIN — SODIUM CHLORIDE, PRESERVATIVE FREE 10 ML: 5 INJECTION INTRAVENOUS at 08:39

## 2023-09-26 RX ADMIN — MIRTAZAPINE 15 MG: 15 TABLET, FILM COATED ORAL at 20:06

## 2023-09-26 ASSESSMENT — PAIN DESCRIPTION - LOCATION: LOCATION: GENERALIZED

## 2023-09-26 ASSESSMENT — PAIN SCALES - WONG BAKER
WONGBAKER_NUMERICALRESPONSE: 0

## 2023-09-26 ASSESSMENT — PAIN SCALES - GENERAL
PAINLEVEL_OUTOF10: 0
PAINLEVEL_OUTOF10: 8

## 2023-09-26 ASSESSMENT — PAIN DESCRIPTION - DESCRIPTORS: DESCRIPTORS: ACHING

## 2023-09-26 NOTE — PROGRESS NOTES
[facility]  Office: [phone]  Jody Clarke, DO, Margaret Brand, DO, Darylene Given Blood, DO, Ameena Baer MD, Juli Lees MD, Tara aDnielle MD, Shu Gómez MD,  Jesus Wright MD, Tram Cooper MD, Monica Gomez, DO, Omar Luke MD,  Trevor Juarez MD, Jada Ramesh MD, Nadine Yuan, DO, Lynda Balderas MD,  Brigido De León, DO, Bibiana Hess MD, Paul Johnson MD, Marline Pritchard MD, Petra Suarez MD,  Nichol Espinoza MD, Bárbara Camacho MD, Macarena Stern MD, Elliott Jackson MD, Madiha Rodriguez, DO, Levi Goss MD,  Young Stratton MD, Silvestre Mcgovern MD, Estrella Flwoers, CNP,  Sin Mosquera, CNP,, Elena Valenzuela, CNP,  Erna Rosales, MAREK, Pelon Snider CNP, Mauricio Brown, CNP, Rona Martinez, CNP, Ashu Martínez, CNP, Marlin Barton, CNP, Chin Beltre, CNP, Vianey Zhao, CNS, Ninfa Galloway, CNP, Maxine Rodgers, [address] [place] [address]    Progress Note    9/26/2023    1:10 PM    Name:   Sofi Davidson  MRN:     [id number]     Acct:      [de-identified]   Room:   [address] [place] [address] Day:  4  Admit Date:  9/22/2023 12:23 PM    PCP:   No primary care provider on file. Code Status:  DNR-CC    Subjective:     Pt seen and examined this morning. Sat well on room air. Remained afebrile, /91. Pt is not answering and RN reported that he is not following any commands. He opened his eyes and yelled at RN when she tried to reposition him. Medications:      Allergies:  No Known Allergies    Current Meds:   Scheduled Meds:    mirtazapine  15 mg Oral Nightly    QUEtiapine  50 mg Oral Nightly    cefTRIAXone (ROCEPHIN) IV  1,000 mg IntraVENous Q24H    sodium chloride flush  5-40 mL IntraVENous 2 times per day    heparin (porcine)  5,000 Units SubCUTAneous 3 times per day     Continuous Infusions:    sodium chloride 100 mL/hr at 09/26/23 0839    sodium chloride       PRN Meds: potassium chloride **OR**

## 2023-09-26 NOTE — PROGRESS NOTES
Occupational [facility]  Occupational Therapy Not Seen Note    DATE: 2023    NAME: Saeed Russell  MRN: [id number]   : 1934      Patient not seen this date for Occupational Therapy due to:    RN Cx d/t:  Pt not medically appropriate. Will have Hospice consult.         Electronically signed by AVNI Self OTR/L on 2023 at 1:30 PM

## 2023-09-26 NOTE — PLAN OF CARE
Problem: Discharge Planning  Goal: Discharge to home or other facility with appropriate resources  9/26/2023 1132 by Chauncey Saldivar RN  Outcome: Progressing  Flowsheets (Taken 9/26/2023 0845)  Discharge to home or other facility with appropriate resources: Identify barriers to discharge with patient and caregiver   Pt to discharge to facility once medically stable. Problem: Pain  Goal: Verbalizes/displays adequate comfort level or baseline comfort level  9/26/2023 1132 by Chauncey Saldivar RN  Outcome: Progressing   Pt showing no s/s of pain at this time. Problem: Skin/Tissue Integrity  Goal: Absence of new skin breakdown  Description: 1. Monitor for areas of redness and/or skin breakdown  2. Assess vascular access sites hourly  3. Every 4-6 hours minimum:  Change oxygen saturation probe site  4. Every 4-6 hours:  If on nasal continuous positive airway pressure, respiratory therapy assess nares and determine need for appliance change or resting period. 9/26/2023 1132 by Chauncey Saldivar RN  Outcome: Progressing   Monitoring for skin breakdown. Pt continues to be turned q2h and prn for comfort. Problem: Safety - Adult  Goal: Free from fall injury  9/26/2023 1132 by Chauncey Saldivar RN  Outcome: Progressing  Flowsheets (Taken 9/26/2023 0844)  Free From Fall Injury: Instruct family/caregiver on patient safety   Safety maintained. Problem: ABCDS Injury Assessment  Goal: Absence of physical injury  9/26/2023 1132 by Chauncey Saldivar RN  Outcome: Progressing  Flowsheets (Taken 9/26/2023 0844)  Absence of Physical Injury: Implement safety measures based on patient assessment   No injury noted this shift.

## 2023-09-26 NOTE — PLAN OF CARE
Problem: Discharge Planning  Goal: Discharge to home or other facility with appropriate resources  9/26/2023 0639 by Bandar Wood RN  Outcome: Progressing  9/25/2023 1931 by Karina Nuñez RN  Outcome: Not Progressing   Son consult with palliative and hospice, working to find SNF placement with hospice services. Problem: Pain  Goal: Verbalizes/displays adequate comfort level or baseline comfort level  9/26/2023 0639 by Bandar Wood RN  Outcome: Progressing  9/25/2023 1931 by Karina Nuñez RN  Outcome: Not Progressing   Patient denies pain at this time, continuing to monitor. Problem: Skin/Tissue Integrity  Goal: Absence of new skin breakdown  Description: 1. Monitor for areas of redness and/or skin breakdown  2. Assess vascular access sites hourly  3. Every 4-6 hours minimum:  Change oxygen saturation probe site  4. Every 4-6 hours:  If on nasal continuous positive airway pressure, respiratory therapy assess nares and determine need for appliance change or resting period.   9/26/2023 1362 by Bandar Wood RN  Outcome: Progressing  Problem: Safety - Adult  Goal: Free from fall injury  9/26/2023 0639 by Bandar Wood RN  Outcome: Progressing  9/25/2023 1931 by Karina Nuñez RN  Outcome: Not Progressing  Flowsheets (Taken 9/25/2023 1119)  Free From Fall Injury: Instruct family/caregiver on patient safety   No falls/injuries this shift, bed in lowest position, brakes on, bed alarm on, call light in reach, side rails up x2   Problem: ABCDS Injury Assessment  Goal: Absence of physical injury  9/26/2023 0639 by Bandar Wood RN  Outcome: Progressing  9/25/2023 1931 by Karina Nuñez RN  Outcome: Not Progressing  Flowsheets (Taken 9/25/2023 1119)  Absence of Physical Injury: Implement safety measures based on patient assessment

## 2023-09-26 NOTE — PLAN OF CARE
Spoke to Pt's son Shyla Mccormack. Updated him regarding pt's medical condition and discussed with him pt's baseline behavior at home. Son told that [address] [place] [address] lives with him and they had no concerns of him not eating or being un cooperative. He also mentioned that yesterday he was at the hospital and pt ate his food and was taking normal with family. Writer requested them to stop by today if possible. Will continue to follow and update the family.     Moises Kendall MD

## 2023-09-26 NOTE — PROGRESS NOTES
Physical Therapy                                                       Physical Therapy Cancel Note      DATE: 2023    NAME: Urvashi Morris  MRN: [id number]   : 1934      Patient not seen this date for Physical Therapy due to: Other: RN Cx d/t:  Pt not medically appropriate. Will have Hospice consult again due to change in status.       Electronically signed by Sage Howell PT on 2023 at 1:44 PM

## 2023-09-27 LAB
ANION GAP SERPL CALCULATED.3IONS-SCNC: 6 MMOL/L (ref 9–17)
BASOPHILS # BLD: 0.02 K/UL (ref 0–0.2)
BASOPHILS NFR BLD: 0 % (ref 0–2)
BUN SERPL-MCNC: 12 MG/DL (ref 8–23)
CALCIUM SERPL-MCNC: 7.4 MG/DL (ref 8.6–10.4)
CHLORIDE SERPL-SCNC: 112 MMOL/L (ref 98–107)
CO2 SERPL-SCNC: 22 MMOL/L (ref 20–31)
CREAT SERPL-MCNC: 0.6 MG/DL (ref 0.7–1.2)
EOSINOPHIL # BLD: 0.25 K/UL (ref 0–0.4)
EOSINOPHILS RELATIVE PERCENT: 5 % (ref 1–4)
ERYTHROCYTE [DISTWIDTH] IN BLOOD BY AUTOMATED COUNT: 14.9 % (ref 12.5–15.4)
GFR SERPL CREATININE-BSD FRML MDRD: >60 ML/MIN/1.73M2
GLUCOSE SERPL-MCNC: 106 MG/DL (ref 70–99)
HCT VFR BLD AUTO: 36.4 % (ref 41–53)
HGB BLD-MCNC: 11.6 G/DL (ref 13.5–17.5)
LYMPHOCYTES NFR BLD: 1.12 K/UL (ref 1–4.8)
LYMPHOCYTES RELATIVE PERCENT: 21 % (ref 24–44)
MCH RBC QN AUTO: 32 PG (ref 26–34)
MCHC RBC AUTO-ENTMCNC: 31.9 G/DL (ref 31–37)
MCV RBC AUTO: 100.3 FL (ref 80–100)
MICROORGANISM SPEC CULT: NORMAL
MICROORGANISM SPEC CULT: NORMAL
MONOCYTES NFR BLD: 0.51 K/UL (ref 0.1–1.2)
MONOCYTES NFR BLD: 9 % (ref 2–11)
NEUTROPHILS NFR BLD: 65 % (ref 36–66)
NEUTS SEG NFR BLD: 3.54 K/UL (ref 1.8–7.7)
PLATELET # BLD AUTO: 334 K/UL (ref 140–450)
PMV BLD AUTO: 10 FL (ref 8–14)
POTASSIUM SERPL-SCNC: 3.6 MMOL/L (ref 3.7–5.3)
RBC # BLD AUTO: 3.63 M/UL (ref 4.5–5.9)
SERVICE CMNT-IMP: NORMAL
SERVICE CMNT-IMP: NORMAL
SODIUM SERPL-SCNC: 140 MMOL/L (ref 135–144)
SPECIMEN DESCRIPTION: NORMAL
SPECIMEN DESCRIPTION: NORMAL
WBC OTHER # BLD: 5.4 K/UL (ref 3.5–11)

## 2023-09-27 PROCEDURE — 80048 BASIC METABOLIC PNL TOTAL CA: CPT

## 2023-09-27 PROCEDURE — 6370000000 HC RX 637 (ALT 250 FOR IP): Performed by: STUDENT IN AN ORGANIZED HEALTH CARE EDUCATION/TRAINING PROGRAM

## 2023-09-27 PROCEDURE — 99232 SBSQ HOSP IP/OBS MODERATE 35: CPT | Performed by: STUDENT IN AN ORGANIZED HEALTH CARE EDUCATION/TRAINING PROGRAM

## 2023-09-27 PROCEDURE — 2580000003 HC RX 258: Performed by: STUDENT IN AN ORGANIZED HEALTH CARE EDUCATION/TRAINING PROGRAM

## 2023-09-27 PROCEDURE — 85025 COMPLETE CBC W/AUTO DIFF WBC: CPT

## 2023-09-27 PROCEDURE — 6360000002 HC RX W HCPCS: Performed by: STUDENT IN AN ORGANIZED HEALTH CARE EDUCATION/TRAINING PROGRAM

## 2023-09-27 PROCEDURE — 36415 COLL VENOUS BLD VENIPUNCTURE: CPT

## 2023-09-27 PROCEDURE — 97166 OT EVAL MOD COMPLEX 45 MIN: CPT

## 2023-09-27 PROCEDURE — 2060000000 HC ICU INTERMEDIATE R&B

## 2023-09-27 PROCEDURE — 97116 GAIT TRAINING THERAPY: CPT

## 2023-09-27 PROCEDURE — 97530 THERAPEUTIC ACTIVITIES: CPT

## 2023-09-27 PROCEDURE — 97535 SELF CARE MNGMENT TRAINING: CPT

## 2023-09-27 RX ADMIN — HEPARIN SODIUM 5000 UNITS: 5000 INJECTION INTRAVENOUS; SUBCUTANEOUS at 20:48

## 2023-09-27 RX ADMIN — MORPHINE SULFATE 2 MG: 2 INJECTION, SOLUTION INTRAMUSCULAR; INTRAVENOUS at 22:17

## 2023-09-27 RX ADMIN — POTASSIUM BICARBONATE 20 MEQ: 782 TABLET, EFFERVESCENT ORAL at 13:54

## 2023-09-27 RX ADMIN — HEPARIN SODIUM 5000 UNITS: 5000 INJECTION INTRAVENOUS; SUBCUTANEOUS at 13:55

## 2023-09-27 RX ADMIN — QUETIAPINE FUMARATE 50 MG: 25 TABLET ORAL at 20:48

## 2023-09-27 RX ADMIN — SODIUM CHLORIDE, PRESERVATIVE FREE 10 ML: 5 INJECTION INTRAVENOUS at 20:48

## 2023-09-27 RX ADMIN — MIRTAZAPINE 15 MG: 15 TABLET, FILM COATED ORAL at 20:48

## 2023-09-27 RX ADMIN — SODIUM CHLORIDE, PRESERVATIVE FREE 10 ML: 5 INJECTION INTRAVENOUS at 09:57

## 2023-09-27 RX ADMIN — HEPARIN SODIUM 5000 UNITS: 5000 INJECTION INTRAVENOUS; SUBCUTANEOUS at 06:32

## 2023-09-27 NOTE — PROGRESS NOTES
Physical Therapy  Facility/Department: [facility] ICU  Physical Therapy Daily Progress Note    Name: Dale Baker  : 1934  MRN: [id number]  Date of Service: 2023    Discharge Recommendations:  Patient would benefit from continued therapy after discharge          Patient Diagnosis(es): The primary encounter diagnosis was Acute urinary retention. Diagnoses of Acute renal failure, unspecified acute renal failure type ([address]) and Urinary tract infection without hematuria, site unspecified were also pertinent to this visit. Past Medical History:  has a past medical history of Arthritis, Arthropathy, unspecified, site unspecified, Coronary atherosclerosis of unspecified type of vessel, native or graft, Other and unspecified hyperlipidemia, and Unspecified essential hypertension. Past Surgical History:  has a past surgical history that includes Knee arthroscopy; Coronary angioplasty with stent; Total hip arthroplasty; and Total shoulder arthroplasty (Right). Assessment   Body Structures, Functions, Activity Limitations Requiring Skilled Therapeutic Intervention: Decreased functional mobility ; Decreased safe awareness;Decreased balance;Decreased posture;Decreased cognition  Assessment: Patient was living at home until early september and was discharged to [facility] on 23. Pt presents with cognitive deficits which places him at a fall risk and limits activity. Pt required modA x 2 to transfer supine to sit and to stand. Pt required Kyree to ambulate 8' to toilet RW. Pt sat EOB ~15 SBA-CGA to eat lunch. Pt requires 24 assist and would benefit from continued therapy. Pt is appropriate for return to prior facility.   Therapy Prognosis: Fair  Decision Making: Medium Complexity  Requires PT Follow-Up: Yes  Activity Tolerance  Activity Tolerance: Treatment limited secondary to decreased cognition     Plan   Physcial Therapy Plan  General Plan: 3-5 times per week  Current Treatment

## 2023-09-27 NOTE — PLAN OF CARE
functional status, cognitive ability or social support system     Problem: Pain  Goal: Verbalizes/displays adequate comfort level or baseline comfort level  Outcome: Progressing  Flowsheets  Taken 9/27/2023 1611  Verbalizes/displays adequate comfort level or baseline comfort level:   Encourage patient to monitor pain and request assistance   Assess pain using appropriate pain scale   Administer analgesics based on type and severity of pain and evaluate response   Implement non-pharmacological measures as appropriate and evaluate response   Notify Licensed Independent Practitioner if interventions unsuccessful or patient reports new pain  Taken 9/27/2023 0844  Verbalizes/displays adequate comfort level or baseline comfort level:   Encourage patient to monitor pain and request assistance   Assess pain using appropriate pain scale   Administer analgesics based on type and severity of pain and evaluate response   Implement non-pharmacological measures as appropriate and evaluate response   Notify Licensed Independent Practitioner if interventions unsuccessful or patient reports new pain     Problem: Skin/Tissue Integrity  Goal: Absence of new skin breakdown  Description: 1. Monitor for areas of redness and/or skin breakdown  2. Assess vascular access sites hourly  3. Every 4-6 hours minimum:  Change oxygen saturation probe site  4. Every 4-6 hours:  If on nasal continuous positive airway pressure, respiratory therapy assess nares and determine need for appliance change or resting period.   Outcome: Progressing     Problem: Safety - Adult  Goal: Free from fall injury  Outcome: Progressing     Problem: ABCDS Injury Assessment  Goal: Absence of physical injury  Outcome: Progressing

## 2023-09-27 NOTE — PLAN OF CARE
Problem: Discharge Planning  Goal: Discharge to home or other facility with appropriate resources  9/26/2023 2128 by Brijesh Armstrong RN  Outcome: Progressing  9/26/2023 1132 by Madalyn Anderson RN  Outcome: Progressing  Flowsheets (Taken 9/26/2023 0845)  Discharge to home or other facility with appropriate resources: Identify barriers to discharge with patient and caregiver     Problem: Pain  Goal: Verbalizes/displays adequate comfort level or baseline comfort level  9/26/2023 2128 by Brijesh Armstrong RN  Outcome: Progressing  Flowsheets (Taken 9/26/2023 1619 by Madalyn Anderson RN)  Verbalizes/displays adequate comfort level or baseline comfort level: Encourage patient to monitor pain and request assistance  9/26/2023 1132 by Madalyn Anderson RN  Outcome: Progressing     Problem: Skin/Tissue Integrity  Goal: Absence of new skin breakdown  Description: 1. Monitor for areas of redness and/or skin breakdown  2. Assess vascular access sites hourly  3. Every 4-6 hours minimum:  Change oxygen saturation probe site  4. Every 4-6 hours:  If on nasal continuous positive airway pressure, respiratory therapy assess nares and determine need for appliance change or resting period.   9/26/2023 2128 by Brijesh Armstrong RN  Outcome: Progressing  9/26/2023 1132 by Madalyn Anderson RN  Outcome: Progressing     Problem: Safety - Adult  Goal: Free from fall injury  9/26/2023 2128 by Brijesh Armstrong RN  Outcome: Progressing  9/26/2023 1132 by Madalyn Anderson RN  Outcome: Progressing  Flowsheets (Taken 9/26/2023 0844)  Free From Fall Injury: Cori Magdalenoyusuf family/caregiver on patient safety     Problem: ABCDS Injury Assessment  Goal: Absence of physical injury  9/26/2023 2128 by Brijesh Armstrong RN  Outcome: Progressing  9/26/2023 1132 by Madalyn Anderson RN  Outcome: Progressing  Flowsheets (Taken 9/26/2023 0844)  Absence of Physical Injury: Implement safety measures based on patient assessment

## 2023-09-27 NOTE — PROGRESS NOTES
Daughter in law took home right hearing aide- after changing battery, still not working. Left is working after battery change. Kept with patient.

## 2023-09-27 NOTE — CARE COORDINATION
Transitional Planning      Spoke with Gerardo Sepulveda, daughter in law, she does not want to push patient to do therapy to be a skilled placement. Writer sent medicaid application to [employer] 9/25. Gaby requests referral to [facility] for long term care. Referral sent. Writer gave [employer] of facilities that accept pending medicaid status- [facility] list [facility] and [facility]. Spoke with GUTIERREZ at [facility]. Explained patient is wanting to come long term with pending medicaid and hospice on board. She states she will contact DON to ask and update writer.

## 2023-09-27 NOTE — PROGRESS NOTES
[facility]  PROGRESS NOTE    Room # 712/517-81   Name: Christian Del Real           Reason for visit: Follow up    I visited the patient. Admit Date & Time: 9/22/2023 12:23 PM    Assessment:  Christian Del Real is a 80 y.o. male in the hospital because \"renal failure\". Upon entering the room patient was sleeping      Intervention: This writer offered brief silent prayer for pt    Outcome:  Patient remained sleeping     Plan:  Chaplains will remain available to offer spiritual and emotional support as needed.     Electronically signed by Caryle Miyamoto, on 9/27/2023 at 10:29 AM.  [address] -       09/27/23 1028   Encounter Summary   Encounter Overview/Reason  Follow-up   Service Provided For: Patient   Referral/Consult From: [place] [address] Children;Family members   Last Encounter  09/27/23   Complexity of Encounter Low   Begin Time 1022   End Time  1024   Total Time Calculated 2 min   Spiritual/Emotional needs   Type Spiritual Support   Assessment/Intervention/Outcome   Assessment Unable to assess  (patient sleeping)   Intervention Prayer (assurance of)/[place]   Outcome Did not respond

## 2023-09-27 NOTE — PROGRESS NOTES
Occupational Therapy  Facility/Department: [facility] [address] [facility]  Occupational Therapy Initial Assessment    Name: Trisha Chávez  : 1934  MRN: [id number]  Date of Service: 2023    Discharge Recommendations:  Patient would benefit from continued therapy after discharge        Patient Diagnosis(es): The primary encounter diagnosis was Acute urinary retention. Diagnoses of Acute renal failure, unspecified acute renal failure type ([address]) and Urinary tract infection without hematuria, site unspecified were also pertinent to this visit. Past Medical History:  has a past medical history of Arthritis, Arthropathy, unspecified, site unspecified, Coronary atherosclerosis of unspecified type of vessel, native or graft, Other and unspecified hyperlipidemia, and Unspecified essential hypertension. Past Surgical History:  has a past surgical history that includes Knee arthroscopy; Coronary angioplasty with stent; Total hip arthroplasty; and Total shoulder arthroplasty (Right). Assessment   Performance deficits / Impairments: Decreased functional mobility ; Decreased ADL status; Decreased strength;Decreased safe awareness;Decreased cognition;Decreased endurance;Decreased balance;Decreased fine motor control;Decreased coordination  Assessment: Pt presents this date with decreased ADL status secondary to above noted deficits requiring assistance and max verbal/tactile cueing for engagement in all functional tasks. Pt to benefit from continued therapy services while hospitalized and at discharge to maximize pt's safety and independence in performing functional tasks. Pt to require strict 24hr supervision/assistance at discharge based on pt's current functional ability and decreased cognition/safety awareness.   Prognosis: Fair  Decision Making: Medium Complexity  REQUIRES OT FOLLOW-UP: Yes  Activity Tolerance  Activity Tolerance: Treatment limited secondary to decreased cognition;Patient limited by fatigue

## 2023-09-27 NOTE — PROGRESS NOTES
Updated daughter in law of patient condition via phone of patient doing much better and eating. To obtain food for patient as well as hearing aide batteries due to hearing aides being dead.

## 2023-09-28 LAB
ANION GAP SERPL CALCULATED.3IONS-SCNC: 6 MMOL/L (ref 9–17)
CHLORIDE SERPL-SCNC: 109 MMOL/L (ref 98–107)
CO2 SERPL-SCNC: 25 MMOL/L (ref 20–31)
POTASSIUM SERPL-SCNC: 4 MMOL/L (ref 3.7–5.3)
SODIUM SERPL-SCNC: 140 MMOL/L (ref 135–144)

## 2023-09-28 PROCEDURE — 2060000000 HC ICU INTERMEDIATE R&B

## 2023-09-28 PROCEDURE — 99232 SBSQ HOSP IP/OBS MODERATE 35: CPT | Performed by: STUDENT IN AN ORGANIZED HEALTH CARE EDUCATION/TRAINING PROGRAM

## 2023-09-28 PROCEDURE — 6370000000 HC RX 637 (ALT 250 FOR IP): Performed by: STUDENT IN AN ORGANIZED HEALTH CARE EDUCATION/TRAINING PROGRAM

## 2023-09-28 PROCEDURE — 2580000003 HC RX 258: Performed by: STUDENT IN AN ORGANIZED HEALTH CARE EDUCATION/TRAINING PROGRAM

## 2023-09-28 PROCEDURE — 6360000002 HC RX W HCPCS: Performed by: STUDENT IN AN ORGANIZED HEALTH CARE EDUCATION/TRAINING PROGRAM

## 2023-09-28 PROCEDURE — 97535 SELF CARE MNGMENT TRAINING: CPT

## 2023-09-28 PROCEDURE — 36415 COLL VENOUS BLD VENIPUNCTURE: CPT

## 2023-09-28 PROCEDURE — 80051 ELECTROLYTE PANEL: CPT

## 2023-09-28 RX ORDER — ASPIRIN 81 MG/1
81 TABLET ORAL DAILY
Status: DISCONTINUED | OUTPATIENT
Start: 2023-09-28 | End: 2023-10-06 | Stop reason: HOSPADM

## 2023-09-28 RX ORDER — TAMSULOSIN HYDROCHLORIDE 0.4 MG/1
0.4 CAPSULE ORAL DAILY
Status: DISCONTINUED | OUTPATIENT
Start: 2023-09-28 | End: 2023-10-06 | Stop reason: HOSPADM

## 2023-09-28 RX ORDER — PRAVASTATIN SODIUM 20 MG
40 TABLET ORAL DAILY
Status: DISCONTINUED | OUTPATIENT
Start: 2023-09-28 | End: 2023-10-06 | Stop reason: HOSPADM

## 2023-09-28 RX ADMIN — HEPARIN SODIUM 5000 UNITS: 5000 INJECTION INTRAVENOUS; SUBCUTANEOUS at 13:02

## 2023-09-28 RX ADMIN — SODIUM CHLORIDE, PRESERVATIVE FREE 10 ML: 5 INJECTION INTRAVENOUS at 13:02

## 2023-09-28 RX ADMIN — MIRTAZAPINE 15 MG: 15 TABLET, FILM COATED ORAL at 19:51

## 2023-09-28 RX ADMIN — HEPARIN SODIUM 5000 UNITS: 5000 INJECTION INTRAVENOUS; SUBCUTANEOUS at 05:38

## 2023-09-28 RX ADMIN — QUETIAPINE FUMARATE 50 MG: 25 TABLET ORAL at 19:51

## 2023-09-28 RX ADMIN — SODIUM CHLORIDE, PRESERVATIVE FREE 10 ML: 5 INJECTION INTRAVENOUS at 19:54

## 2023-09-28 RX ADMIN — ASPIRIN 81 MG: 81 TABLET, COATED ORAL at 13:01

## 2023-09-28 RX ADMIN — LORAZEPAM 1 MG: 2 INJECTION INTRAMUSCULAR; INTRAVENOUS at 18:21

## 2023-09-28 RX ADMIN — TAMSULOSIN HYDROCHLORIDE 0.4 MG: 0.4 CAPSULE ORAL at 13:01

## 2023-09-28 RX ADMIN — PRAVASTATIN SODIUM 40 MG: 20 TABLET ORAL at 13:01

## 2023-09-28 NOTE — PROGRESS NOTES
Occupational Therapy  Facility/Department: [facility] [address] [facility]  Occupational Therapy Daily Treatment Note     Name: Rob Smallwood  : 1934  MRN: [id number]  Date of Service: 2023    Discharge Recommendations:  Patient would benefit from continued therapy after discharge  OT Equipment Recommendations  Equipment Needed: No       Patient Diagnosis(es): The primary encounter diagnosis was Acute urinary retention. Diagnoses of Acute renal failure, unspecified acute renal failure type ([address]) and Urinary tract infection without hematuria, site unspecified were also pertinent to this visit. Past Medical History:  has a past medical history of Arthritis, Arthropathy, unspecified, site unspecified, Coronary atherosclerosis of unspecified type of vessel, native or graft, Other and unspecified hyperlipidemia, and Unspecified essential hypertension. Past Surgical History:  has a past surgical history that includes Knee arthroscopy; Coronary angioplasty with stent; Total hip arthroplasty; and Total shoulder arthroplasty (Right). Assessment   Performance deficits / Impairments: Decreased functional mobility ; Decreased ADL status; Decreased strength;Decreased safe awareness;Decreased cognition;Decreased endurance;Decreased balance;Decreased fine motor control;Decreased coordination  Assessment: Pt presents this date with decreased ADL status secondary to above noted deficits requiring assistance and max verbal/tactile cueing for engagement in all functional tasks. Pt to benefit from continued therapy services while hospitalized and at discharge to maximize pt's safety and independence in performing functional tasks. Pt to require strict 24hr supervision/assistance at discharge based on pt's current functional ability and decreased cognition/safety awareness.   Prognosis: Fair  REQUIRES OT FOLLOW-UP: Yes  Activity Tolerance  Activity Tolerance: Treatment limited secondary to decreased cognition;Patient limited by fatigue        Plan   Occupational Therapy Plan  Times Per Week: 5-6x/wk  Current Treatment Recommendations: Balance training, Functional mobility training, Endurance training, Patient/Caregiver education & training, Safety education & training, Self-Care / ADL, Equipment evaluation, education, & procurement, Strengthening, Coordination training, Cognitive reorientation     Restrictions  Restrictions/Precautions  Restrictions/Precautions: Fall Risk  Required Braces or Orthoses?: No  Position Activity Restriction  Other position/activity restrictions: Up with assistance    Subjective   General  Patient assessed for rehabilitation services?: Yes  Family / Caregiver Present: No  General Comment  Comments: RN ok'd for therapy this PM. Pt with significant confusion throughout session, cooperative/pleasant however requires constant redirection. Pt states no pain       Objective                Safety Devices  Type of Devices: Call light within reach;Nurse notified; Left in bed;Bed alarm in place  Restraints  Restraints Initially in Place: No  Balance  Sitting: Impaired (sat edge of bed with mod A)        ADL  Grooming: Moderate assistance;Verbal cueing  Grooming Skilled Clinical Factors: to comb hair seated edge of bed  UE Bathing: Increased time to complete; Moderate assistance;Verbal cueing  UE Dressing: Increased time to complete; Moderate assistance;Verbal cueing  LE Dressing: Verbal cueing; Increased time to complete;Maximum assistance  LE Dressing Skilled Clinical Factors: pt likes to figit, pt has decreased [facility] for use of wash cloth, unable to put socks on, could not do 1/2 tailor sit this date        Bed mobility  Rolling to Left: Moderate assistance  Rolling to Right: Maximum assistance  Supine to Sit: Moderate assistance  Sit to Supine: Moderate assistance  Scooting:  Moderate assistance  Bed Mobility Comments: Max verbal/tactile cueing for initiation/sequencing/attention to task; increased time/effort to

## 2023-09-28 NOTE — PROGRESS NOTES
[facility]  Office: [phone]  Natalie Rowell, DO, Chad Vazquez, DO, Althea Denny Blood, DO, Joshua Zee MD, Kunal Pham MD, Sajan Llanes MD, Kimberly Mata MD,  Eusebio Vitale MD, Nathaly Barry MD, Kathi Casanova DO, Vern Colbert MD,  Miguel Rosales MD, Eulogio Fuentes MD, Carina Russ DO, José Luis Veras MD,  Glenn Velasquez DO, Jared Yun MD, Karthik Cueto MD, Kris Morales MD, Marlee Ding MD,  Elaine Prieto MD, Sina Davis MD, Carlos Blanton MD, Darrel Nunez MD, Lea Jose DO, Lolis Rodgers MD,  Mary Ann Tijerina MD, Kofi Delgado MD, López Tejeda, CNP,  Dominga Kang, CNP,, Shasta Young, CNP,  Nj James, [facility], Kaity Olivas, CNP, Chad Chandra, CNP, Bia Callaway, CNP, Vannessa Rodgers, CNP, Toya Salinas, CNP, Chaya Leonardo, CNP, Praful Alarcon, CNS, Adonay Alejandra, [facility], Devendra Mina, [address] [place] [address] Drive    Progress Note    9/28/2023    10:24 AM    Name:   Jose Elias Brown  MRN:     [id number]     Acct:      [de-identified]   Room:   [address] [place] [address] Day:  6  Admit Date:  9/22/2023 12:23 PM    PCP:   No primary care provider on file. Code Status:  DNR-CC    Subjective:     Patient seen and examined this morning. Lying in his bed. Neuro following verbal command. Reported that overnight patient was agitated. Yesterday had a discussion with daughter-in-law, she was concerned that patient cannot go home and spoke to her  and herself working and cannot take care of him. Remained afebrile, HR 71, /73. Saturating well on room air. His potassium was low on previous lab work, will check his electrolytes. Patient came in the ER retention, creatinine at the time of admission was 8.1, creatinine improved after Mariee insertion. He has BPH. Started on Flomax today. Medications:      Allergies:  No Known Allergies    Current Meds:   Scheduled Meds: and family services.         Authur Duverney, MD  9/28/2023  10:24 AM

## 2023-09-28 NOTE — PLAN OF CARE
Problem: Discharge Planning  Goal: Discharge to home or other facility with appropriate resources  Outcome: Progressing  Flowsheets  Taken 9/28/2023 1200  Discharge to home or other facility with appropriate resources:   Identify barriers to discharge with patient and caregiver   Arrange for needed discharge resources and transportation as appropriate   Identify discharge learning needs (meds, wound care, etc)   Refer to discharge planning if patient needs post-hospital services based on physician order or complex needs related to functional status, cognitive ability or social support system  Taken 9/28/2023 0823  Discharge to home or other facility with appropriate resources:   Identify barriers to discharge with patient and caregiver   Arrange for needed discharge resources and transportation as appropriate   Identify discharge learning needs (meds, wound care, etc)   Refer to discharge planning if patient needs post-hospital services based on physician order or complex needs related to functional status, cognitive ability or social support system     Problem: Pain  Goal: Verbalizes/displays adequate comfort level or baseline comfort level  Outcome: Progressing  Flowsheets  Taken 9/28/2023 1151  Verbalizes/displays adequate comfort level or baseline comfort level:   Encourage patient to monitor pain and request assistance   Assess pain using appropriate pain scale   Administer analgesics based on type and severity of pain and evaluate response   Implement non-pharmacological measures as appropriate and evaluate response   Notify Licensed Independent Practitioner if interventions unsuccessful or patient reports new pain  Taken 9/28/2023 0823  Verbalizes/displays adequate comfort level or baseline comfort level:   Encourage patient to monitor pain and request assistance   Assess pain using appropriate pain scale   Administer analgesics based on type and severity of pain and evaluate response   Implement non-pharmacological measures as appropriate and evaluate response   Notify Licensed Independent Practitioner if interventions unsuccessful or patient reports new pain     Problem: Safety - Adult  Goal: Free from fall injury  Outcome: Progressing     Problem: ABCDS Injury Assessment  Goal: Absence of physical injury  Outcome: Progressing     Problem: Skin/Tissue Integrity  Goal: Absence of new skin breakdown  Description: 1. Monitor for areas of redness and/or skin breakdown  2. Assess vascular access sites hourly  3. Every 4-6 hours minimum:  Change oxygen saturation probe site  4. Every 4-6 hours:  If on nasal continuous positive airway pressure, respiratory therapy assess nares and determine need for appliance change or resting period.   Outcome: Progressing

## 2023-09-29 PROBLEM — R33.8 ACUTE URINARY RETENTION: Status: ACTIVE | Noted: 2023-09-11

## 2023-09-29 PROBLEM — N13.8 BPH WITH OBSTRUCTION/LOWER URINARY TRACT SYMPTOMS: Status: ACTIVE | Noted: 2023-09-29

## 2023-09-29 PROBLEM — N40.1 BPH WITH OBSTRUCTION/LOWER URINARY TRACT SYMPTOMS: Status: ACTIVE | Noted: 2023-09-29

## 2023-09-29 PROCEDURE — 6360000002 HC RX W HCPCS: Performed by: STUDENT IN AN ORGANIZED HEALTH CARE EDUCATION/TRAINING PROGRAM

## 2023-09-29 PROCEDURE — 6370000000 HC RX 637 (ALT 250 FOR IP): Performed by: STUDENT IN AN ORGANIZED HEALTH CARE EDUCATION/TRAINING PROGRAM

## 2023-09-29 PROCEDURE — 99232 SBSQ HOSP IP/OBS MODERATE 35: CPT | Performed by: STUDENT IN AN ORGANIZED HEALTH CARE EDUCATION/TRAINING PROGRAM

## 2023-09-29 PROCEDURE — 99232 SBSQ HOSP IP/OBS MODERATE 35: CPT | Performed by: SPECIALIST

## 2023-09-29 PROCEDURE — 2580000003 HC RX 258: Performed by: STUDENT IN AN ORGANIZED HEALTH CARE EDUCATION/TRAINING PROGRAM

## 2023-09-29 PROCEDURE — 2060000000 HC ICU INTERMEDIATE R&B

## 2023-09-29 RX ADMIN — QUETIAPINE FUMARATE 50 MG: 25 TABLET ORAL at 20:41

## 2023-09-29 RX ADMIN — HEPARIN SODIUM 5000 UNITS: 5000 INJECTION INTRAVENOUS; SUBCUTANEOUS at 16:22

## 2023-09-29 RX ADMIN — MIRTAZAPINE 15 MG: 15 TABLET, FILM COATED ORAL at 20:41

## 2023-09-29 RX ADMIN — HEPARIN SODIUM 5000 UNITS: 5000 INJECTION INTRAVENOUS; SUBCUTANEOUS at 20:41

## 2023-09-29 RX ADMIN — TAMSULOSIN HYDROCHLORIDE 0.4 MG: 0.4 CAPSULE ORAL at 08:21

## 2023-09-29 RX ADMIN — PRAVASTATIN SODIUM 40 MG: 20 TABLET ORAL at 08:20

## 2023-09-29 RX ADMIN — ASPIRIN 81 MG: 81 TABLET, COATED ORAL at 08:21

## 2023-09-29 RX ADMIN — SODIUM CHLORIDE, PRESERVATIVE FREE 10 ML: 5 INJECTION INTRAVENOUS at 08:26

## 2023-09-29 NOTE — PLAN OF CARE
Problem: Pain  Goal: Verbalizes/displays adequate comfort level or baseline comfort level  Outcome: Progressing     Problem: Skin/Tissue Integrity  Goal: Absence of new skin breakdown  Description: 1. Monitor for areas of redness and/or skin breakdown  2. Assess vascular access sites hourly  3. Every 4-6 hours minimum:  Change oxygen saturation probe site  4. Every 4-6 hours:  If on nasal continuous positive airway pressure, respiratory therapy assess nares and determine need for appliance change or resting period.   Outcome: Progressing     Problem: Safety - Adult  Goal: Free from fall injury  Outcome: Progressing  Flowsheets (Taken 9/29/2023 9430)  Free From Fall Injury: Instruct family/caregiver on patient safety     Problem: Nutrition Deficit:  Goal: Optimize nutritional status  9/29/2023 8644 by Amos Munoz RN  Outcome: Progressing

## 2023-09-29 NOTE — PROGRESS NOTES
Comprehensive Nutrition Assessment    Type and Reason for Visit:  RD Nutrition Re-Screen/LOS    Nutrition Recommendations/Plan:   Continue current diet, encourage PO intake  ONS- Ensure Milkshake TID  Monitor weight, intake, labs, skin     Malnutrition Assessment:  Malnutrition Status: At risk for malnutrition (Comment) (pt is refusing some meals, pending further PO intake) (09/29/23 8362)    Context:  Acute Illness     Findings of the 6 clinical characteristics of malnutrition:  Energy Intake:  Mild decrease in energy intake (Comment)  Weight Loss:  Unable to assess     Body Fat Loss:  Mild body fat loss     Muscle Mass Loss:  Mild muscle mass loss    Fluid Accumulation:  No significant fluid accumulation     Strength:  Not Performed    Nutrition Assessment:    Pt admitted with acute renal failure. LOS nutrition assessment. Noted pt currently weighs 11% less than inital stated weight upon admission, anticipate stated weight inaccurate. Per chart, pt has been refusing meals at times. Noted pt is also disoriented. PO intake 51-75% meals and 1-25% ONS. Will provide ONS in milkshake form to encourage intake. Pt BMI on low end of healthy for age. No wounds noted. Anticipate inconsistent nutritional intake. Nutrition Related Findings:      Wound Type: None       Current Nutrition Intake & Therapies:    Average Meal Intake: 51-75%  Average Supplements Intake: 1-25%  ADULT DIET; Regular  ADULT ORAL NUTRITION SUPPLEMENT; Breakfast, Lunch, Dinner; Standard High Calorie/High Protein Oral Supplement    Anthropometric Measures:  Height: 6' 1.23\" (186 cm)  Ideal Body Weight (IBW): 185 lbs (84 kg)    Current Body Weight: 175 lb 14.8 oz (79.8 kg), 95.1 % IBW.     Current BMI (kg/m2): 23.1  Estimated Daily Nutrient Needs:  Energy Requirements Based On: Kcal/kg  Weight Used for Energy Requirements: Current  Energy (kcal/day): 0271-8430 kcal/day (26-29 kcal/kg)  Weight Used for Protein Requirements: Current  Protein (g/day):

## 2023-09-29 NOTE — CARE COORDINATION
Transitional Planning    Received call from [facility]- they cannot accept referral. Referral sent to Nilton Monson. Message sent to Mikey Card at [facility] department to inquire of medicaid status.

## 2023-09-29 NOTE — PLAN OF CARE
Problem: Discharge Planning  Goal: Discharge to home or other facility with appropriate resources  9/29/2023 0102 by Ifeanyi Blue RN  Outcome: Progressing  9/28/2023 1504 by Clarence Tavarez RN  Outcome: Progressing  Flowsheets  Taken 9/28/2023 1200  Discharge to home or other facility with appropriate resources:   Identify barriers to discharge with patient and caregiver   Arrange for needed discharge resources and transportation as appropriate   Identify discharge learning needs (meds, wound care, etc)   Refer to discharge planning if patient needs post-hospital services based on physician order or complex needs related to functional status, cognitive ability or social support system  Taken 9/28/2023 0823  Discharge to home or other facility with appropriate resources:   Identify barriers to discharge with patient and caregiver   Arrange for needed discharge resources and transportation as appropriate   Identify discharge learning needs (meds, wound care, etc)   Refer to discharge planning if patient needs post-hospital services based on physician order or complex needs related to functional status, cognitive ability or social support system     Problem: Pain  Goal: Verbalizes/displays adequate comfort level or baseline comfort level  9/29/2023 0102 by Ifeanyi Blue RN  Outcome: Progressing  Flowsheets (Taken 9/28/2023 1545 by Clarence Tavarez RN)  Verbalizes/displays adequate comfort level or baseline comfort level:   Encourage patient to monitor pain and request assistance   Assess pain using appropriate pain scale   Administer analgesics based on type and severity of pain and evaluate response   Implement non-pharmacological measures as appropriate and evaluate response   Notify Licensed Independent Practitioner if interventions unsuccessful or patient reports new pain  9/28/2023 1504 by Clarence Tavarez RN  Outcome: Progressing  Flowsheets  Taken 9/28/2023 1151  Verbalizes/displays adequate comfort level or

## 2023-09-29 NOTE — PLAN OF CARE
Nutrition Problem #1: Predicted inadequate energy intake  Intervention: Food and/or Nutrient Delivery: Continue Current Diet, Modify Oral Nutrition Supplement  Nutritional Goal: At least 50% PO intake prior to discharge

## 2023-09-29 NOTE — CONSULTS
[facility] Urology  Mili Kaylynn. Ankit Sherman MD [address] [facility] [address] [facility]    Urology Consultation    Patient:  Ana Maria Tamez  MRN: [id number]  YOB: 1934  Consult requested from Laine Daniel MD  for purpose of evaluation of acute urinary retention. CHIEF COMPLAINT:  incomplete bladder emptying     HISTORY OF PRESENT ILLNESS:   The patient is a 80 y.o. male who presents with:  Urinary Retention:  Patient is here today for Acute Urinary Retention which occured several week(s) ago   and was sudden in onset. Patient currently does have a urinary catheter in place. Volume of urine obtained when catheter was placed: volume >1000 cc  Prior to this event voiding symptoms consisted of inability to urinate  Prior treatments include: Flomax/tamsulosin 0.4 mg po qd for BPH symptoms. Patient's old records, notes and chart reviewed and summarized above.     Past Medical History:    Past Medical History:   Diagnosis Date    Arthritis     Arthropathy, unspecified, site unspecified     Coronary atherosclerosis of unspecified type of vessel, native or graft     Other and unspecified hyperlipidemia     Unspecified essential hypertension        Past Surgical History:    Past Surgical History:   Procedure Laterality Date    CORONARY ANGIOPLASTY WITH STENT PLACEMENT      PHYLLIS x 2 RCA, LAD occl unable to cross    KNEE ARTHROSCOPY      SHOULDER ARTHROPLASTY Right     TOTAL HIP ARTHROPLASTY         Medications:    Scheduled Meds:   aspirin  81 mg Oral Daily    pravastatin  40 mg Oral Daily    tamsulosin  0.4 mg Oral Daily    mirtazapine  15 mg Oral Nightly    QUEtiapine  50 mg Oral Nightly    sodium chloride flush  5-40 mL IntraVENous 2 times per day    heparin (porcine)  5,000 Units SubCUTAneous 3 times per day     Continuous Infusions:   sodium chloride       PRN Meds:.potassium chloride **OR** potassium alternative oral replacement **OR** potassium chloride, sodium chloride flush, sodium chloride, ondansetron **OR**

## 2023-09-30 PROCEDURE — 97116 GAIT TRAINING THERAPY: CPT

## 2023-09-30 PROCEDURE — 6370000000 HC RX 637 (ALT 250 FOR IP): Performed by: STUDENT IN AN ORGANIZED HEALTH CARE EDUCATION/TRAINING PROGRAM

## 2023-09-30 PROCEDURE — 97530 THERAPEUTIC ACTIVITIES: CPT

## 2023-09-30 PROCEDURE — 51701 INSERT BLADDER CATHETER: CPT

## 2023-09-30 PROCEDURE — 97535 SELF CARE MNGMENT TRAINING: CPT

## 2023-09-30 PROCEDURE — 51798 US URINE CAPACITY MEASURE: CPT

## 2023-09-30 PROCEDURE — 2060000000 HC ICU INTERMEDIATE R&B

## 2023-09-30 PROCEDURE — 6360000002 HC RX W HCPCS: Performed by: STUDENT IN AN ORGANIZED HEALTH CARE EDUCATION/TRAINING PROGRAM

## 2023-09-30 PROCEDURE — 99232 SBSQ HOSP IP/OBS MODERATE 35: CPT | Performed by: STUDENT IN AN ORGANIZED HEALTH CARE EDUCATION/TRAINING PROGRAM

## 2023-09-30 PROCEDURE — 6370000000 HC RX 637 (ALT 250 FOR IP): Performed by: UROLOGY

## 2023-09-30 RX ORDER — LIDOCAINE HYDROCHLORIDE 20 MG/ML
JELLY TOPICAL ONCE
Status: COMPLETED | OUTPATIENT
Start: 2023-09-30 | End: 2023-09-30

## 2023-09-30 RX ADMIN — TAMSULOSIN HYDROCHLORIDE 0.4 MG: 0.4 CAPSULE ORAL at 09:16

## 2023-09-30 RX ADMIN — HEPARIN SODIUM 5000 UNITS: 5000 INJECTION INTRAVENOUS; SUBCUTANEOUS at 06:19

## 2023-09-30 RX ADMIN — ASPIRIN 81 MG: 81 TABLET, COATED ORAL at 09:16

## 2023-09-30 RX ADMIN — HEPARIN SODIUM 5000 UNITS: 5000 INJECTION INTRAVENOUS; SUBCUTANEOUS at 14:26

## 2023-09-30 RX ADMIN — PRAVASTATIN SODIUM 40 MG: 20 TABLET ORAL at 09:16

## 2023-09-30 RX ADMIN — HEPARIN SODIUM 5000 UNITS: 5000 INJECTION INTRAVENOUS; SUBCUTANEOUS at 20:34

## 2023-09-30 RX ADMIN — MIRTAZAPINE 15 MG: 15 TABLET, FILM COATED ORAL at 20:34

## 2023-09-30 RX ADMIN — QUETIAPINE FUMARATE 50 MG: 25 TABLET ORAL at 20:34

## 2023-09-30 RX ADMIN — LIDOCAINE HYDROCHLORIDE: 20 JELLY TOPICAL at 11:30

## 2023-09-30 NOTE — PROGRESS NOTES
Occupational Therapy  Facility/Department: [facility] [address] [facility]  Occupational Therapy Treatment Note      Name: Delfino Holland  : 1934  MRN: [id number]  Date of Service: 2023    Discharge Recommendations:  Patient would benefit from continued therapy after discharge, 24 hour supervision or assist    OT Equipment Recommendations  Equipment Needed: No    Patient Diagnosis(es): The primary encounter diagnosis was Acute urinary retention. Diagnoses of Acute renal failure, unspecified acute renal failure type ([address]) and Urinary tract infection without hematuria, site unspecified were also pertinent to this visit. Past Medical History:  has a past medical history of Arthritis, Arthropathy, unspecified, site unspecified, Coronary atherosclerosis of unspecified type of vessel, native or graft, Other and unspecified hyperlipidemia, and Unspecified essential hypertension. Past Surgical History:  has a past surgical history that includes Knee arthroscopy; Coronary angioplasty with stent; Total hip arthroplasty; and Total shoulder arthroplasty (Right). Assessment   Performance deficits / Impairments: Decreased functional mobility ; Decreased ADL status; Decreased strength;Decreased safe awareness;Decreased cognition;Decreased endurance;Decreased balance;Decreased fine motor control;Decreased coordination  Prognosis: Fair  Decision Making: Medium Complexity  Activity Tolerance  Activity Tolerance: Treatment limited secondary to decreased cognition;Patient limited by fatigue          Plan   Occupational Therapy Plan  Times Per Week: 5-6x/wk  Current Treatment Recommendations: Balance training, Functional mobility training, Endurance training, Patient/Caregiver education & training, Safety education & training, Self-Care / ADL, Equipment evaluation, education, & procurement, Strengthening, Coordination training, Cognitive reorientation       Restrictions  Restrictions/Precautions  Restrictions/Precautions: Dressing Skilled Clinical Factors: While sitting at EOB, Pt participated in LBD to doff // then don Bilateral socks (completion of R sock Max A; completion of L sock with Mod A). Toileting Skilled Clinical Factors: Maintained dynamic standing balance // tolerance while TD toilet hygiene and brief mangement were completed. Additional Comments: Pt required consistent Max Cues for task initiation & motor planning // safe & accurate technique // frequent rest breaks. Activity Tolerance  Activity Tolerance: Treatment limited secondary to decreased cognition  Bed mobility  Supine to Sit: Moderate assistance  Sit to Supine: Moderate assistance  Scooting: Moderate assistance  Bed Mobility Comments: HOB elevated then flat;  Bedrail (Left). Max Cues task initiation & motor planning // integration of Halima Late // body mechanics. Transfers  Stand Pivot Transfers: Moderate assistance;2 Person assistance  Sit to stand: Moderate assistance;2 Person assistance  Stand to sit: Moderate assistance;2 Person assistance  Transfer Comments: Using RW;  Increased time // effort for task participation. Pt required Max Cues for task initiation & motor planning // safe & accurate technique // frequent rest breaks. Pt c/o increase in R Knee Pain with movement. Vision  Vision:  (Unable to assess vision due to decreased cognition; pt does mention having glasses 1x during session)  Hearing  Hearing: Exceptions to [facility]  Hearing Exceptions: Hard of hearing/hearing concerns; Left hearing aid    Cognition  Overall Cognitive Status: Exceptions  Arousal/Alertness: Inconsistent responses to stimuli  Following Commands: Follows one step commands with increased time; Follows one step commands with repetition  Attention Span: Attends with cues to redirect; Difficulty attending to directions  Memory: Decreased long term memory;Decreased recall of precautions;Decreased recall of recent events;Decreased short term memory  Safety Judgement: Decreased

## 2023-09-30 NOTE — PROGRESS NOTES
Physical Therapy  Facility/Department: [facility] MED SURG ICU  Physical Therapy Daily Progress Note    Name: Tara Sutton  : 1934  MRN: [id number]  Date of Service: 2023    Discharge Recommendations:  Patient would benefit from continued therapy after discharge   PT Equipment Recommendations  Equipment Needed: No      Patient Diagnosis(es): The primary encounter diagnosis was Acute urinary retention. Diagnoses of Acute renal failure, unspecified acute renal failure type ([address]) and Urinary tract infection without hematuria, site unspecified were also pertinent to this visit. Past Medical History:  has a past medical history of Arthritis, Arthropathy, unspecified, site unspecified, Coronary atherosclerosis of unspecified type of vessel, native or graft, Other and unspecified hyperlipidemia, and Unspecified essential hypertension. Past Surgical History:  has a past surgical history that includes Knee arthroscopy; Coronary angioplasty with stent; Total hip arthroplasty; and Total shoulder arthroplasty (Right). Assessment   Body Structures, Functions, Activity Limitations Requiring Skilled Therapeutic Intervention: Decreased functional mobility ; Decreased safe awareness;Decreased balance;Decreased posture;Decreased cognition  Assessment: Patient was living at home until early september and was discharged to [facility] on 23. Pt presents with cognitive deficits which places him at a fall risk and limits activity. Pt required modA x 2 to transfer with RW and Max assist x 2 for bed mobiity. Pt required mod assist to ambulate 10' with RW and chair follow. Pt requires 24 assist and would benefit from continued therapy. Pt is appropriate for return to prior facility.   Therapy Prognosis: Fair  Decision Making: Medium Complexity  Requires PT Follow-Up: Yes  Activity Tolerance  Activity Tolerance: Treatment limited secondary to decreased cognition     Plan   Physcial Therapy Plan  General Plan: unable to provide due to dementia; family has had hospice consult       Education  Patient Education  Education Given To: Patient  Education Provided: Plan of Care;Precautions;Transfer Training  Education Provided Comments: VC for hand placement, bed mobility, gait training. Education Method: Verbal;Demonstration  Barriers to Learning: Cognition; Hearing  Education Outcome: Unable to verbalize; Unable to demonstrate understanding;Continued education needed      Therapy Time   Individual Concurrent Group Co-treatment   Time In 1329         Time Out 1416         Minutes 47         Timed Code Treatment Minutes: [address], PT

## 2023-10-01 PROCEDURE — 6370000000 HC RX 637 (ALT 250 FOR IP): Performed by: STUDENT IN AN ORGANIZED HEALTH CARE EDUCATION/TRAINING PROGRAM

## 2023-10-01 PROCEDURE — 2060000000 HC ICU INTERMEDIATE R&B

## 2023-10-01 PROCEDURE — 6360000002 HC RX W HCPCS: Performed by: STUDENT IN AN ORGANIZED HEALTH CARE EDUCATION/TRAINING PROGRAM

## 2023-10-01 PROCEDURE — 99232 SBSQ HOSP IP/OBS MODERATE 35: CPT | Performed by: STUDENT IN AN ORGANIZED HEALTH CARE EDUCATION/TRAINING PROGRAM

## 2023-10-01 RX ADMIN — TAMSULOSIN HYDROCHLORIDE 0.4 MG: 0.4 CAPSULE ORAL at 08:09

## 2023-10-01 RX ADMIN — HEPARIN SODIUM 5000 UNITS: 5000 INJECTION INTRAVENOUS; SUBCUTANEOUS at 21:44

## 2023-10-01 RX ADMIN — ASPIRIN 81 MG: 81 TABLET, COATED ORAL at 08:09

## 2023-10-01 RX ADMIN — HEPARIN SODIUM 5000 UNITS: 5000 INJECTION INTRAVENOUS; SUBCUTANEOUS at 13:46

## 2023-10-01 RX ADMIN — QUETIAPINE FUMARATE 50 MG: 25 TABLET ORAL at 21:44

## 2023-10-01 RX ADMIN — PRAVASTATIN SODIUM 40 MG: 20 TABLET ORAL at 08:09

## 2023-10-01 RX ADMIN — MIRTAZAPINE 15 MG: 15 TABLET, FILM COATED ORAL at 21:44

## 2023-10-02 PROCEDURE — 97116 GAIT TRAINING THERAPY: CPT

## 2023-10-02 PROCEDURE — 97530 THERAPEUTIC ACTIVITIES: CPT

## 2023-10-02 PROCEDURE — 6370000000 HC RX 637 (ALT 250 FOR IP): Performed by: STUDENT IN AN ORGANIZED HEALTH CARE EDUCATION/TRAINING PROGRAM

## 2023-10-02 PROCEDURE — 99221 1ST HOSP IP/OBS SF/LOW 40: CPT | Performed by: STUDENT IN AN ORGANIZED HEALTH CARE EDUCATION/TRAINING PROGRAM

## 2023-10-02 PROCEDURE — 6360000002 HC RX W HCPCS: Performed by: STUDENT IN AN ORGANIZED HEALTH CARE EDUCATION/TRAINING PROGRAM

## 2023-10-02 PROCEDURE — 97535 SELF CARE MNGMENT TRAINING: CPT

## 2023-10-02 PROCEDURE — 97110 THERAPEUTIC EXERCISES: CPT

## 2023-10-02 PROCEDURE — 2060000000 HC ICU INTERMEDIATE R&B

## 2023-10-02 RX ADMIN — MIRTAZAPINE 15 MG: 15 TABLET, FILM COATED ORAL at 20:27

## 2023-10-02 RX ADMIN — HEPARIN SODIUM 5000 UNITS: 5000 INJECTION INTRAVENOUS; SUBCUTANEOUS at 14:08

## 2023-10-02 RX ADMIN — QUETIAPINE FUMARATE 50 MG: 25 TABLET ORAL at 20:27

## 2023-10-02 RX ADMIN — TAMSULOSIN HYDROCHLORIDE 0.4 MG: 0.4 CAPSULE ORAL at 08:27

## 2023-10-02 RX ADMIN — HEPARIN SODIUM 5000 UNITS: 5000 INJECTION INTRAVENOUS; SUBCUTANEOUS at 20:27

## 2023-10-02 RX ADMIN — HEPARIN SODIUM 5000 UNITS: 5000 INJECTION INTRAVENOUS; SUBCUTANEOUS at 05:50

## 2023-10-02 RX ADMIN — PRAVASTATIN SODIUM 40 MG: 20 TABLET ORAL at 08:27

## 2023-10-02 RX ADMIN — ASPIRIN 81 MG: 81 TABLET, COATED ORAL at 08:27

## 2023-10-02 NOTE — PROGRESS NOTES
Physical Therapy  Facility/Department: [facility] ICU  Physical Therapy Daily Treatment Note    Name: Cata Dean  : 1934  MRN: [id number]  Date of Service: 10/2/2023    Discharge Recommendations:  Patient would benefit from continued therapy after discharge          Patient Diagnosis(es): The primary encounter diagnosis was Acute urinary retention. Diagnoses of Acute renal failure, unspecified acute renal failure type ([address]) and Urinary tract infection without hematuria, site unspecified were also pertinent to this visit. Past Medical History:  has a past medical history of Arthritis, Arthropathy, unspecified, site unspecified, Coronary atherosclerosis of unspecified type of vessel, native or graft, Other and unspecified hyperlipidemia, and Unspecified essential hypertension. Past Surgical History:  has a past surgical history that includes Knee arthroscopy; Coronary angioplasty with stent; Total hip arthroplasty; and Total shoulder arthroplasty (Right). Assessment   Body Structures, Functions, Activity Limitations Requiring Skilled Therapeutic Intervention: Decreased functional mobility ; Decreased safe awareness;Decreased balance;Decreased posture;Decreased cognition    Assessment: Pt required CGA rolling in bed, min supine to sit, sit to stand was min from edge of bed and mod from recliner, ambulated 20ft and 12ft with rolling walker min of one for balance and walker maneuvering. Pt requires 24 hour supervision/asssist and would benefit from continued PT for strengthening, balance, safety and functional mobility training while in the hospital and at discharge.     Requires PT Follow-Up: Yes    Activity Tolerance  Activity Tolerance: Treatment limited secondary to decreased cognition;Patient limited by pain     Plan   Physcial Therapy Plan  General Plan:  (5-6x/week)  Current Treatment Recommendations: Balance training, Functional mobility training, Transfer training, Gait training, Therapeutic activities, Cognitive reorientation, Endurance training, Safety education & training  Safety Devices  Type of Devices: Call light within reach, Nurse notified, Gait belt, Chair alarm in place, Left in chair  Restraints  Restraints Initially in Place: No     Restrictions  Restrictions/Precautions  Restrictions/Precautions: Fall Risk  Required Braces or Orthoses?: No  Position Activity Restriction  Other position/activity restrictions: Up with assistance, confusion     Subjective   General  Family / Caregiver Present: No  Follows Commands: Impaired  Subjective  Subjective: Pt denied pain upon PT entering room. Pt stated he had a good nights sleep. Pt reporting right knee pain during ambulation, however, did not rate                  Cognition   Orientation  Overall Orientation Status: Impaired  Orientation Level:  (Pt oriented to self only. Pt did state \"hospital\" with mod cuing, however, could not recall name of hospital)  Cognition  Overall Cognitive Status: Exceptions  Arousal/Alertness: Inconsistent responses to stimuli  Following Commands: Follows one step commands with increased time; Follows one step commands with repetition  Attention Span: Attends with cues to redirect  Memory: Decreased long term memory;Decreased recall of recent events  Safety Judgement: Decreased awareness of need for assistance;Decreased awareness of need for safety  Problem Solving: Assistance required to identify errors made;Assistance required to generate solutions;Assistance required to implement solutions;Decreased awareness of errors  Insights: Decreased awareness of deficits  Initiation: Requires cues for some  Sequencing: Requires cues for some       Objective                                Bed mobility  Rolling to Left: Contact guard assistance  Rolling to Right: Contact guard assistance  Supine to Sit: Minimal assistance (use of bed railing. HOB flat)  Scooting:  Moderate assistance (difficulty following cues for

## 2023-10-02 NOTE — PROGRESS NOTES
[facility]  Office: [phone]  Barbara Hamlin, DO, Lizandro Cherry, DO, Stephanie Humphries Blood, DO, Ish Morrison MD, Phan Medina MD, Tristin Yeung MD, Jason Collins MD,  Eduardo Angeles MD, Juancarlos Winchester MD, Candido Mcelroy DO, Laura Antonio MD,  Veto Armijo MD, Alexis Greenberg MD, Drew Espana DO, Petra Almazan MD,  Yoanna Jiménez DO, Jeff Marquez MD, Leandra Morales MD, Cristian Albert MD, Ike Severino MD,  Velasquez Foss MD, Allyssa Riley MD, Sundar Bee MD, Kaycee Cavazos MD, Daniella Hendrickson DO, Jg Pereira MD,  Ilan Ambrosio MD, Tejas Menon MD, Jodeen Kussmaul, CNP,  Oneyda Mcginnis, CNP,, Patrick Hollingsworth, CNP,  Piter Vasques, [facility], Sunil Freitas, CNP, Vijaya Serrano, CNP, Chato Ashford, CNP, Blake Estrella, CNP, Bret Lawson, CNP, Fallon Soares, CNP, Haleigh Garcia, CNS, Fiona Duff, CNP, Jesus Saucedo, [facility]    Progress Note    10/2/2023    1:49 PM    Name:   Adela Butcher  MRN:     [id number]     Acct:      [de-identified]   Room:   [address] [place] [address] Day:  10  Admit Date:  9/22/2023 12:23 PM    PCP:   No primary care provider on file. Code Status:  DNR-CC    Subjective:     Patient seen and examined this morning. He is much more awake, responding to verbal commands. Tolerating p.o. diet. Has Mariee catheter in,Urine output 1425 over last 24 hours. Remained afebrile, HR 71, /63. Saturating well on room air. Waiting for placement    Medications:      Allergies:  No Known Allergies    Current Meds:   Scheduled Meds:    aspirin  81 mg Oral Daily    pravastatin  40 mg Oral Daily    tamsulosin  0.4 mg Oral Daily    mirtazapine  15 mg Oral Nightly    QUEtiapine  50 mg Oral Nightly    sodium chloride flush  5-40 mL IntraVENous 2 times per day    heparin (porcine)  5,000 Units SubCUTAneous 3 times per day     Continuous Infusions:    sodium chloride       PRN Meds:

## 2023-10-02 NOTE — PROGRESS NOTES
Occupational Therapy  Facility/Department: [facility] [address] [facility]  Occupational Therapy Daily Treatment Note    Name: Lizeth Strong  : 1934  MRN: [id number]  Date of Service: 10/2/2023      Discharge Recommendations:  Patient would benefit from continued therapy after discharge        Patient Diagnosis(es): The primary encounter diagnosis was Acute urinary retention. Diagnoses of Acute renal failure, unspecified acute renal failure type ([address]) and Urinary tract infection without hematuria, site unspecified were also pertinent to this visit. Past Medical History:  has a past medical history of Arthritis, Arthropathy, unspecified, site unspecified, Coronary atherosclerosis of unspecified type of vessel, native or graft, Other and unspecified hyperlipidemia, and Unspecified essential hypertension. Past Surgical History:  has a past surgical history that includes Knee arthroscopy; Coronary angioplasty with stent; Total hip arthroplasty; and Total shoulder arthroplasty (Right). Assessment   Performance deficits / Impairments: Decreased functional mobility ; Decreased ADL status; Decreased strength;Decreased safe awareness;Decreased cognition;Decreased endurance;Decreased balance  Assessment: Pt presents with decreased ADL status secondary to above noted deficits requiring assistance and verbal/tactile cueing for engagement in functional tasks. Pt is most significantly limited by impaired cognition. Pt to benefit from continued therapy services while hospitalized to maximize pt's safety and independence with functional tasks. Pt would require 24hr assistance and continued skilled therapy intervention at discharge. Prognosis: Fair  Decision Making: Medium Complexity  Activity Tolerance  Activity Tolerance: Treatment limited secondary to decreased cognition;Patient limited by fatigue      Safety Devices  Type of Devices: Call light within reach;Nurse notified;Gait belt; Chair alarm in place; Left in

## 2023-10-02 NOTE — PLAN OF CARE
Problem: Skin/Tissue Integrity  Goal: Absence of new skin breakdown  Description: 1. Monitor for areas of redness and/or skin breakdown  2. Assess vascular access sites hourly  3. Every 4-6 hours minimum:  Change oxygen saturation probe site  4. Every 4-6 hours:  If on nasal continuous positive airway pressure, respiratory therapy assess nares and determine need for appliance change or resting period.   Outcome: Progressing  No new skin breakdown noted, no new signs/symptoms of infection, continue to monitor labwork including WBC, medications administered per physician orders    Problem: Pain  Goal: Verbalizes/displays adequate comfort level or baseline comfort level  Outcome: Progressing   No new signs/symptoms of pain noted, pain rating < 3 on scale 0-10, pain controlled with medication/repositioning   Problem: Safety - Adult  Goal: Free from fall injury  Outcome: Progressing   No falls/injuries this shift, bed in lowest position, brakes on, bed alarm on, call light in reach, side rails up x2   Problem: ABCDS Injury Assessment  Goal: Absence of physical injury  Outcome: Progressing   No falls/injuries this shift, bed in lowest position, brakes on, bed alarm on, call light in reach, side rails up x2

## 2023-10-03 ENCOUNTER — APPOINTMENT (OUTPATIENT)
Dept: CT IMAGING | Age: 88
End: 2023-10-03
Payer: OTHER GOVERNMENT

## 2023-10-03 ENCOUNTER — APPOINTMENT (OUTPATIENT)
Dept: GENERAL RADIOLOGY | Age: 88
End: 2023-10-03
Payer: OTHER GOVERNMENT

## 2023-10-03 PROCEDURE — 97116 GAIT TRAINING THERAPY: CPT

## 2023-10-03 PROCEDURE — 99231 SBSQ HOSP IP/OBS SF/LOW 25: CPT | Performed by: STUDENT IN AN ORGANIZED HEALTH CARE EDUCATION/TRAINING PROGRAM

## 2023-10-03 PROCEDURE — 2060000000 HC ICU INTERMEDIATE R&B

## 2023-10-03 PROCEDURE — 6360000002 HC RX W HCPCS: Performed by: STUDENT IN AN ORGANIZED HEALTH CARE EDUCATION/TRAINING PROGRAM

## 2023-10-03 PROCEDURE — 97110 THERAPEUTIC EXERCISES: CPT

## 2023-10-03 PROCEDURE — 6370000000 HC RX 637 (ALT 250 FOR IP): Performed by: STUDENT IN AN ORGANIZED HEALTH CARE EDUCATION/TRAINING PROGRAM

## 2023-10-03 PROCEDURE — 73030 X-RAY EXAM OF SHOULDER: CPT

## 2023-10-03 PROCEDURE — 70450 CT HEAD/BRAIN W/O DYE: CPT

## 2023-10-03 PROCEDURE — 97535 SELF CARE MNGMENT TRAINING: CPT

## 2023-10-03 PROCEDURE — 6360000002 HC RX W HCPCS: Performed by: NURSE PRACTITIONER

## 2023-10-03 RX ORDER — LORAZEPAM 2 MG/ML
0.25 INJECTION INTRAMUSCULAR ONCE
Status: COMPLETED | OUTPATIENT
Start: 2023-10-03 | End: 2023-10-03

## 2023-10-03 RX ADMIN — ASPIRIN 81 MG: 81 TABLET, COATED ORAL at 09:54

## 2023-10-03 RX ADMIN — HEPARIN SODIUM 5000 UNITS: 5000 INJECTION INTRAVENOUS; SUBCUTANEOUS at 15:09

## 2023-10-03 RX ADMIN — PRAVASTATIN SODIUM 40 MG: 20 TABLET ORAL at 09:53

## 2023-10-03 RX ADMIN — QUETIAPINE FUMARATE 50 MG: 25 TABLET ORAL at 21:23

## 2023-10-03 RX ADMIN — LORAZEPAM 0.26 MG: 2 INJECTION INTRAMUSCULAR; INTRAVENOUS at 21:37

## 2023-10-03 RX ADMIN — HEPARIN SODIUM 5000 UNITS: 5000 INJECTION INTRAVENOUS; SUBCUTANEOUS at 06:29

## 2023-10-03 RX ADMIN — HEPARIN SODIUM 5000 UNITS: 5000 INJECTION INTRAVENOUS; SUBCUTANEOUS at 21:45

## 2023-10-03 RX ADMIN — TAMSULOSIN HYDROCHLORIDE 0.4 MG: 0.4 CAPSULE ORAL at 09:54

## 2023-10-03 RX ADMIN — MIRTAZAPINE 15 MG: 15 TABLET, FILM COATED ORAL at 21:23

## 2023-10-03 NOTE — PROGRESS NOTES
Patient set off bed alarm attempting to get out of bed. Redirected patient and bed alarm placed back on. Tele sitter also became available. Order put in and tele sitter set up.

## 2023-10-03 NOTE — PROGRESS NOTES
[facility]  Office: [phone]  Janet Hannon, DO, Rafi Sumner DO, Haleigh Stratton DO, Dora Goodwin MD, Silvano Burr MD, Karol Tanner MD, Arcelia Libman, MD,  Dominga Victor MD, Nandini Moore MD, Dov Fofana DO, Bora Camarillo MD,  Filiberto Burnett MD, Jennifer Albarran MD, Onofre Owens DO, Yokasta Byrnes MD,  Arnold Wheat MD, Roselyn Camejo MD, Radha Hunter MD, Helen Portillo MD,  Benigno Winchester MD, Kellie Arteaga MD, Lisa Jasmine MD, Syed Meléndez MD, Marian Simms DO, Laurie Singh MD,  Neftali Moncada MD, Alonso Vanessa MD, Baron Adrian, CNP,  Liudmila Velasquez, CNP,, Rosita Bynum, CNP,  Carie Christianson, [facility], Augusto Greer, CNP, Maria Dover, CNP, Shoaib Benz, CNP, Aaron Medel, CNP, Mason Fong, CNP, Adrienne Echevarria, CNP, Angelica Vitale, CNS, Noa Rey, CNP, Lexi Peterson, [address] [place] [address]    Progress Note    10/3/2023    9:37 AM    Name:   Akua Crane  MRN:     [id number]     Acct:      [de-identified]   Room:   [address] [place] [address] Day:  11  Admit Date:  9/22/2023 12:23 PM    PCP:   No primary care provider on file. Code Status:  DNR-CC    Subjective:     Patient was seen and examined at bedside this AM. He reports feeling well and is without complaint. The patient had an unwitnessed fall in his room this morning. Awaiting placement. Medications:      Allergies:  No Known Allergies    Current Meds:   Scheduled Meds:    aspirin  81 mg Oral Daily    pravastatin  40 mg Oral Daily    tamsulosin  0.4 mg Oral Daily    mirtazapine  15 mg Oral Nightly    QUEtiapine  50 mg Oral Nightly    sodium chloride flush  5-40 mL IntraVENous 2 times per day    heparin (porcine)  5,000 Units SubCUTAneous 3 times per day     Continuous Infusions:    sodium chloride       PRN Meds: potassium chloride **OR** potassium alternative oral replacement **OR** potassium

## 2023-10-03 NOTE — PROGRESS NOTES
Physical Therapy  Facility/Department: [facility] ICU  Physical Therapy Daily Treatment Note    Name: Drea June  : 1934  MRN: [id number]  Date of Service: 10/3/2023    Discharge Recommendations:  Patient would benefit from continued therapy after discharge   PT Equipment Recommendations  Equipment Needed: No      Patient Diagnosis(es): The primary encounter diagnosis was Acute urinary retention. Diagnoses of Acute renal failure, unspecified acute renal failure type ([address]) and Urinary tract infection without hematuria, site unspecified were also pertinent to this visit. Past Medical History:  has a past medical history of Arthritis, Arthropathy, unspecified, site unspecified, Coronary atherosclerosis of unspecified type of vessel, native or graft, Other and unspecified hyperlipidemia, and Unspecified essential hypertension. Past Surgical History:  has a past surgical history that includes Knee arthroscopy; Coronary angioplasty with stent; Total hip arthroplasty; and Total shoulder arthroplasty (Right). Assessment   Body Structures, Functions, Activity Limitations Requiring Skilled Therapeutic Intervention: Decreased functional mobility ; Decreased safe awareness;Decreased balance;Decreased posture;Decreased cognition  Assessment: Pt required min supine to sit, CGA sit to supine. Was min A for sit to stand from edge of bed and CGA for stand to sit. Ambulated 20 ft with rolling walker with min x1 for balance and walker maneuvering during turns. Pt requires 24 hour supervision/asssist and would benefit from continued PT for strengthening, balance, safety and functional mobility training while in the hospital and at discharge.   Therapy Prognosis: Good  Decision Making: Medium Complexity  Requires PT Follow-Up: Yes  Activity Tolerance  Activity Tolerance: Treatment limited secondary to decreased cognition     Plan   Physcial Therapy Plan  General Plan:  (5-6x/wk)  Current Treatment Recommendations: Balance training, Functional mobility training, Transfer training, Gait training, Therapeutic activities, Cognitive reorientation, Endurance training, Safety education & training  Safety Devices  Type of Devices: Call light within reach, Nurse notified, Gait belt, Patient at risk for falls, Bed alarm in place, Left in bed, Telesitter in use  Restraints  Restraints Initially in Place: No     Restrictions  Restrictions/Precautions  Restrictions/Precautions: Fall Risk, Bed Alarm, Other (comment) (telesitter)  Required Braces or Orthoses?: No  Position Activity Restriction  Other position/activity restrictions: Up with assistance, confusion     Subjective   General  Patient assessed for rehabilitation services?: Yes  Additional Pertinent Hx: 10/3/23 CTH neg acute. R shoulder XR: neg acute osseous abnormality. (PMHx includes R TSA)  Family / Caregiver Present: No  Follows Commands: Impaired  Subjective  Subjective: Pt c/o R shoulder blade pain but no pain reported with AROM of RUE; also no report of pain during gait training with RW. RN notified. Cognition   Orientation  Overall Orientation Status: Impaired  Orientation Level: Oriented to place;Oriented to person;Disoriented to time  Cognition  Overall Cognitive Status: Exceptions  Arousal/Alertness: Inconsistent responses to stimuli  Following Commands: Follows one step commands with repetition; Follows one step commands with increased time  Attention Span: Attends with cues to redirect  Memory: Decreased long term memory;Decreased recall of recent events;Decreased recall of precautions  Safety Judgement: Decreased awareness of need for assistance;Decreased awareness of need for safety  Problem Solving: Assistance required to identify errors made;Assistance required to generate solutions;Assistance required to implement solutions;Decreased awareness of errors;Assistance required to correct errors made  Insights: Not aware of

## 2023-10-03 NOTE — PROGRESS NOTES
Occupational Therapy  Facility/Department: [facility] [address] [facility]  Occupational Therapy Daily Treatment Note    Name: Mat Carter  : 1934  MRN: [id number]  Date of Service: 10/3/2023    Chief Complaint   Patient presents with    Abnormal Labs     Discharge Recommendations:  Patient would benefit from continued therapy after discharge, 24 hour supervision or assist    Patient Diagnosis(es): The primary encounter diagnosis was Acute urinary retention. Diagnoses of Acute renal failure, unspecified acute renal failure type ([address]) and Urinary tract infection without hematuria, site unspecified were also pertinent to this visit. Past Medical History:  has a past medical history of Arthritis, Arthropathy, unspecified, site unspecified, Coronary atherosclerosis of unspecified type of vessel, native or graft, Other and unspecified hyperlipidemia, and Unspecified essential hypertension. Past Surgical History:  has a past surgical history that includes Knee arthroscopy; Coronary angioplasty with stent; Total hip arthroplasty; and Total shoulder arthroplasty (Right). Assessment   Performance deficits / Impairments: Decreased functional mobility ; Decreased ADL status; Decreased strength;Decreased safe awareness;Decreased cognition;Decreased endurance;Decreased balance    Assessment: RN ok'd pt for therapy this PM. OT arriving in room with pt attempting to exit bed with telesitter speaking to pt to assist keeping pt in bed. Pt reporting need to use restroom. Pt MIN A bed mobility, MOD A sit<>stand with RW; MOD A functional mobility with RW, MAX A toileting; increased confusion resulting in MOD vc's throughout for safety awareness, use of AE/DME and sequencing/initiation. Pt to benefit from continued therapy services during hospitalization and following discharge to address functional deficits listed above. Pt will require 24-hour assist upon discharge.     Prognosis: Fair  REQUIRES OT FOLLOW-UP: Yes  Activity

## 2023-10-03 NOTE — CARE COORDINATION
Transitional Planning      Email sent to [facility] department regarding medicaid status. Received call from [facility]- they have no male beds available.      200- left VM for Shruthi Baxter, daughter in law

## 2023-10-03 NOTE — PROGRESS NOTES
Patient screamed out upon arrival to room patient found on floor outside of patient restroom. Patient assessed and put back to bed. Goose egg discovered on right side of head and patient complaining of right shoulder/arm pain. Physician notified and CT head and Right shoulder XR ordered and completed. Neuro checks initiated and bed alarm applied. No tele sitters available at time. Address need for tele sitter, one not available at the time.

## 2023-10-03 NOTE — PROGRESS NOTES
"Care Transition Team Assessment     Due to pt's medical condition, LSW obtained the following information used in this assessment from his emergency contact, Julisa Adan ([phone]). Julisa reports being a \"close friend\" for many years and the last time se saw pt was \"about 3 weeks ago.\" Julisa lives \"a couple blocks from him.\"     Julisa reports that pt has been  for \"many many years.\" Pt had a significant other, Gloria, who unfortunately passed away one year ago. Gene and Gloria were together for 20 years. Julisa is unaware of biological children but report Gloria's son, Chucky, \"still checks in on Gene from time to time.\" Julisa reports she \"sent a text\" to Chucky notifying him on pt's hospitalization. To the best of Julisa's knowledge, pt has no ACP documents. Overall, pt has limited d/c support.     Pt lives in a single level house alone in [place], [place]. Pt does not drive and \"doesn't have a licenses, he walks everywhere.\" Prior to current hospitalization, pt was completely independent in ADLS/IADLS. No prior DME. Julisa shared that pt's leg \"always has been bothering him. More when Gloria passed. He stopped caring for himself physically and mentally.\" Julisa shared that pt recently had to \"put his dog down which destroyed him.\" Julisa reports that Gene is mostly in \"good spirits\" but lately has been depressed. Pt is retired and has health insurance.     Julisa reports that Gene and Gloria prior to her passing, \"were both on meth. Maybe that's why she passed.\" Julisa reports that pt tells her he is sober but \"I always wonder if he's using again.\" No history of mental health yet with recent loss in gene's life, Julisa feels pt has been depressed. Pharmacy is [facility] and its unknown if pt has a PCP.     LSW reviewed CM notes from past admissions.     At this time, pt is currently unrepresented.     LSW will continue to assist with social/dc needs     Care Transition Team " Physical Therapy        Physical Therapy Cancel Note      DATE: 10/3/2023    NAME: Anne Marie Crabtree  MRN: [id number]   : 1934      Patient not seen this date for Physical Therapy due to: Other: Pt out of room. Has order for R shoulder x-ray and CT head. Will continue to pursue PT as appropriate.        Electronically signed by Alondra Thurston on 10/3/2023 at 8:44 AM Assessment    Information Source  Orientation : Unable to Assess  Information Given By: Friend  Who is responsible for making decisions for patient? : Other  Name(s) of Primary Decision Maker: Pt is unrepresented    Readmission Evaluation  Is this a readmission?: No    Elopement Risk  Legal Hold: No  Ambulatory or Self Mobile in Wheelchair: No-Not an Elopement Risk  Elopement Risk: Not at Risk for Elopement    Discharge Preparedness  What is your plan after discharge?: Uncertain - pending medical team collaboration  What are your discharge supports?: Other (comment)(Friends)  Prior Functional Level: Ambulatory, Independent with Activities of Daily Living, Independent with Medication Management    Functional Assesment  Prior Functional Level: Ambulatory, Independent with Activities of Daily Living, Independent with Medication Management    Finances  Financial Barriers to Discharge: (Unknown)  Prescription Coverage: Yes    Vision / Hearing Impairment  Right Eye Vision: Impaired, Wears Glasses  Left Eye Vision: Impaired, Wears Glasses    Advance Directive  Advance Directive?: Clinically incapacitated / Inappropriate    Domestic Abuse  Have you ever been the victim of abuse or violence?: No    Psychological Assessment  History of Substance Abuse: Methamphetamine  Date Last Used - Methamphetamine: Last year  Substance Abuse Comments: 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 (70)

## 2023-10-04 LAB
GLUCOSE BLD-MCNC: 144 MG/DL (ref 75–110)
GLUCOSE BLD-MCNC: 161 MG/DL (ref 75–110)
GLUCOSE BLD-MCNC: 94 MG/DL (ref 75–110)

## 2023-10-04 PROCEDURE — 6360000002 HC RX W HCPCS: Performed by: STUDENT IN AN ORGANIZED HEALTH CARE EDUCATION/TRAINING PROGRAM

## 2023-10-04 PROCEDURE — 6370000000 HC RX 637 (ALT 250 FOR IP): Performed by: STUDENT IN AN ORGANIZED HEALTH CARE EDUCATION/TRAINING PROGRAM

## 2023-10-04 PROCEDURE — 82947 ASSAY GLUCOSE BLOOD QUANT: CPT

## 2023-10-04 PROCEDURE — 2060000000 HC ICU INTERMEDIATE R&B

## 2023-10-04 PROCEDURE — 99231 SBSQ HOSP IP/OBS SF/LOW 25: CPT | Performed by: STUDENT IN AN ORGANIZED HEALTH CARE EDUCATION/TRAINING PROGRAM

## 2023-10-04 PROCEDURE — 97116 GAIT TRAINING THERAPY: CPT

## 2023-10-04 RX ADMIN — PRAVASTATIN SODIUM 40 MG: 20 TABLET ORAL at 08:50

## 2023-10-04 RX ADMIN — HEPARIN SODIUM 5000 UNITS: 5000 INJECTION INTRAVENOUS; SUBCUTANEOUS at 20:21

## 2023-10-04 RX ADMIN — ASPIRIN 81 MG: 81 TABLET, COATED ORAL at 08:50

## 2023-10-04 RX ADMIN — TAMSULOSIN HYDROCHLORIDE 0.4 MG: 0.4 CAPSULE ORAL at 08:50

## 2023-10-04 RX ADMIN — POLYETHYLENE GLYCOL 3350 17 G: 17 POWDER, FOR SOLUTION ORAL at 08:50

## 2023-10-04 RX ADMIN — QUETIAPINE FUMARATE 50 MG: 25 TABLET ORAL at 20:21

## 2023-10-04 RX ADMIN — MIRTAZAPINE 15 MG: 15 TABLET, FILM COATED ORAL at 20:21

## 2023-10-04 RX ADMIN — HEPARIN SODIUM 5000 UNITS: 5000 INJECTION INTRAVENOUS; SUBCUTANEOUS at 09:06

## 2023-10-04 NOTE — CARE COORDINATION
SW consulted to assist with resources/support after discharge. Contacted patient's Daughter in law Randolph Hebert to discuss needs. At this time patient requires 24 hour care, family not home 24/7 to assist. Randolph Hebert wanting long-term care placement. Frustrated as there has been no feedback regarding medicaid status. Some concern that patient won't qualify due to income. SW discussed pursuing guardianship as an option to help patient with financial decisions in order to pursue long-term arrangements as he is currently only oriented to self. Randolph Hebert states patient does not have a PCP, only a cardiologist. General Jerson will see if expert eval can be completed during admission or would need to be OP. Spoke to "[employer] with HELP program. There is a Medicare benefit listed within chart, but also multiple other accounts. SW will reach out to registration to help clarify. Left message for CM to follow up.

## 2023-10-04 NOTE — PROGRESS NOTES
Physical Therapy  [facility]  Physical Therapy Daily Treatment Note    Name: Trisha Chávez  : 1934  MRN: [id number]  Date of Service: 10/4/2023    Discharge Recommendations:  Patient would benefit from continued therapy after discharge          Patient Diagnosis(es): The primary encounter diagnosis was Acute urinary retention. Diagnoses of Acute renal failure, unspecified acute renal failure type ([address]) and Urinary tract infection without hematuria, site unspecified were also pertinent to this visit. Past Medical History:  has a past medical history of Arthritis, Arthropathy, unspecified, site unspecified, Coronary atherosclerosis of unspecified type of vessel, native or graft, Other and unspecified hyperlipidemia, and Unspecified essential hypertension. Past Surgical History:  has a past surgical history that includes Knee arthroscopy; Coronary angioplasty with stent; Total hip arthroplasty; and Total shoulder arthroplasty (Right). Assessment   Body Structures, Functions, Activity Limitations Requiring Skilled Therapeutic Intervention: Decreased functional mobility ; Decreased safe awareness;Decreased balance;Decreased posture;Decreased cognition    Assessment: Noted increase confusion today with difficulty following verbal and tactile cues for direction and attempt at exercises. Pt required CGA bed mobility, mod for transfers, min assist for 15ft with rolling walker. Pt requires 24 hour assist/supervision and would benefit from continued PT for balance, safety and functional mobility training while in the hospital and at discharge.     Requires PT Follow-Up: Yes    Activity Tolerance  Activity Tolerance: Treatment limited secondary to decreased cognition     Plan   Physcial Therapy Plan  General Plan:  (5-6x/wk)  Current Treatment Recommendations: Balance training, Functional mobility training, Transfer training, Gait training, Therapeutic activities, Cognitive reorientation, Endurance training, Safety education & training  Safety Devices  Type of Devices: Call light within reach, Nurse notified, Gait belt, Patient at risk for falls, Telesitter in use, Left in chair, Chair alarm in place  Restraints  Restraints Initially in Place: No     Restrictions  Restrictions/Precautions  Restrictions/Precautions: Fall Risk, Bed Alarm, Other (comment) (telesitter)  Required Braces or Orthoses?: No  Position Activity Restriction  Other position/activity restrictions: Up with assistance, confusion     Subjective   General  Family / Caregiver Present: No  Follows Commands: Impaired  Subjective  Subjective: Observed pt rubbing left shoulder and proximal humerus area with pt reporting soreness, however, denied pain. Did not rate                  Cognition   Orientation  Overall Orientation Status: Impaired  Orientation Level:  (oriented to self only)  Cognition  Overall Cognitive Status: Exceptions  Arousal/Alertness: Delayed responses to stimuli  Following Commands: Follows one step commands with increased time; Follows one step commands with repetition  Attention Span: Attends with cues to redirect  Memory: Decreased long term memory;Decreased recall of recent events;Decreased recall of precautions;Decreased short term memory  Safety Judgement: Decreased awareness of need for assistance;Decreased awareness of need for safety  Problem Solving: Assistance required to identify errors made;Assistance required to generate solutions;Assistance required to implement solutions;Decreased awareness of errors;Assistance required to correct errors made  Insights: Not aware of deficits  Initiation: Requires cues for all  Sequencing: Requires cues for some  Cognition Comment: Telesitter in room     Objective                                Bed mobility  Supine to Sit: Contact guard assistance (use of bed rail.  Increase time and effort)  Scooting: Contact guard assistance  Bed Mobility Comments: constant

## 2023-10-04 NOTE — PLAN OF CARE
Problem: Discharge Planning  Goal: Discharge to home or other facility with appropriate resources  Outcome: Progressing   [facility]. Problem: Pain  Goal: Verbalizes/displays adequate comfort level or baseline comfort level  Outcome: Progressing   Pt has no c/o pain at this time; continuing to ask verbally and assess for grimaces in on-verbal moments. Problem: Skin/Tissue Integrity  Goal: Absence of new skin breakdown  Description: 1. Monitor for areas of redness and/or skin breakdown  2. Assess vascular access sites hourly  3. Every 4-6 hours minimum:  Change oxygen saturation probe site  4. Every 4-6 hours:  If on nasal continuous positive airway pressure, respiratory therapy assess nares and determine need for appliance change or resting period. Outcome: Progressing   Pt is continually turning self and provided pillow support and hourly rounding to prevent skin tears and breakdown.

## 2023-10-04 NOTE — PROGRESS NOTES
[facility]  Office: [phone]  Miki Santana DO, Rehan Figueroa DO, Babita Stratton DO, Maria R Smith MD, Kane Love MD, Janes Serrato MD, Adryan Churchill MD,  Karl Munoz MD, Nj Shaikh MD, Hank Champagne DO, Mally Gonzalez MD,  Pablo Menedz MD, Parris Cain MD, Bascom Osler, DO, Mary Pineda MD,  Kobi Grijalva MD, Nickolas Mcgee MD, Adriana Bowen MD, Matt Rodriguez MD,  Lesvia Christian MD, Abad Wang MD, Saurabh Crandall MD, Lissy Gee MD, Betsy Madsen DO, Meliton Bloch, MD,  Anabelle Ruiz MD, Yesenia Ramos MD, Donald Valentin, CNP,  Severo Rous, CNP,, Edward Toledo, CNP,  Edgar Beard, [facility], Marycruz Sage, CNP, Orquidea Arias, CNP, Leyla Heaton, CNP, Joaquin Fairchild, CNP, Erin Burleson, CNP, Moustapha Mayfield, CNP, Clem Redding, CNS, Cynthia Menchaca CNP, Milly [place], [address] [place] [address]    Progress Note    10/4/2023    6:37 AM    Name:   Km Fowler  MRN:     [id number]     Acct:      [de-identified]   Room:   [address] [place] [address] Day:  12  Admit Date:  9/22/2023 12:23 PM    PCP:   No primary care provider on file. Code Status:  DNR-CC    Subjective:     Patient was seen and examined at bedside this AM. He is resting comfortably. Awaiting placement. Medications:      Allergies:  No Known Allergies    Current Meds:   Scheduled Meds:    aspirin  81 mg Oral Daily    pravastatin  40 mg Oral Daily    tamsulosin  0.4 mg Oral Daily    mirtazapine  15 mg Oral Nightly    QUEtiapine  50 mg Oral Nightly    sodium chloride flush  5-40 mL IntraVENous 2 times per day    heparin (porcine)  5,000 Units SubCUTAneous 3 times per day     Continuous Infusions:    sodium chloride       PRN Meds: potassium chloride **OR** potassium alternative oral replacement **OR** potassium chloride, sodium chloride flush, sodium chloride, ondansetron **OR** ondansetron, ill-appearing.    Mental Status:  oriented only to person   Lungs:  clear to auscultation bilaterally, normal effort  Heart:  regular rate and rhythm, no murmur  Abdomen:  soft, nontender, nondistended, normal bowel sounds, no masses, hepatomegaly, splenomegaly  Extremities:  no edema, redness, tenderness in the calves  Skin:  no gross lesions, rashes, induration    Assessment:     Hospital Problems             Last Modified POA    * (Principal) Acute renal failure (ARF) ([address]) 9/22/2023 Yes    HTN (hypertension) (Chronic) 9/22/2023 Yes    Hyperlipidemia (Chronic) 9/22/2023 Yes    Acute urinary retention 9/29/2023 Yes    Failure to thrive in adult 9/26/2023 Yes    BPH with obstruction/lower urinary tract symptoms 9/29/2023 Yes       Plan:     Acute renal failure   -Resolved   -Maintain canseco in place   -Awaiting placement     Hyperlipidemia   -Continue home pravastatin     Dementia   -Patient is alert and oriented only to self (this is his baseline)   -Awaiting placement       Kaleb Ricardo MD  10/4/2023  6:37 AM

## 2023-10-05 LAB — GLUCOSE BLD-MCNC: 149 MG/DL (ref 75–110)

## 2023-10-05 PROCEDURE — 6370000000 HC RX 637 (ALT 250 FOR IP): Performed by: STUDENT IN AN ORGANIZED HEALTH CARE EDUCATION/TRAINING PROGRAM

## 2023-10-05 PROCEDURE — 97535 SELF CARE MNGMENT TRAINING: CPT

## 2023-10-05 PROCEDURE — 6360000002 HC RX W HCPCS: Performed by: STUDENT IN AN ORGANIZED HEALTH CARE EDUCATION/TRAINING PROGRAM

## 2023-10-05 PROCEDURE — 99231 SBSQ HOSP IP/OBS SF/LOW 25: CPT | Performed by: STUDENT IN AN ORGANIZED HEALTH CARE EDUCATION/TRAINING PROGRAM

## 2023-10-05 PROCEDURE — 2060000000 HC ICU INTERMEDIATE R&B

## 2023-10-05 PROCEDURE — 97116 GAIT TRAINING THERAPY: CPT

## 2023-10-05 PROCEDURE — 82947 ASSAY GLUCOSE BLOOD QUANT: CPT

## 2023-10-05 RX ADMIN — TAMSULOSIN HYDROCHLORIDE 0.4 MG: 0.4 CAPSULE ORAL at 09:09

## 2023-10-05 RX ADMIN — ASPIRIN 81 MG: 81 TABLET, COATED ORAL at 09:09

## 2023-10-05 RX ADMIN — HEPARIN SODIUM 5000 UNITS: 5000 INJECTION INTRAVENOUS; SUBCUTANEOUS at 20:10

## 2023-10-05 RX ADMIN — HEPARIN SODIUM 5000 UNITS: 5000 INJECTION INTRAVENOUS; SUBCUTANEOUS at 14:59

## 2023-10-05 RX ADMIN — PRAVASTATIN SODIUM 40 MG: 20 TABLET ORAL at 09:10

## 2023-10-05 RX ADMIN — MIRTAZAPINE 15 MG: 15 TABLET, FILM COATED ORAL at 20:11

## 2023-10-05 RX ADMIN — QUETIAPINE FUMARATE 50 MG: 25 TABLET ORAL at 20:11

## 2023-10-05 RX ADMIN — HEPARIN SODIUM 5000 UNITS: 5000 INJECTION INTRAVENOUS; SUBCUTANEOUS at 05:22

## 2023-10-05 NOTE — PROGRESS NOTES
[facility]  Office: [phone]  Dolores Jim DO, Terri Irwin DO, Nena Stratton DO, Marielle Jim MD, Hitesh Majano MD, Tristin King MD, Anaid Garcia MD,  Sudheer Narayanan MD, Sonal Nash MD, Gretel Rosales DO, Rajinder Cloud MD,  Vanesa Mcneil MD, Asha Thurman MD, Ines Riley DO, Luis Armando Llanes MD,  Jessica Villegas DO, Vidhya Contreras MD, Tacho Booker MD, Keanu Mendez MD, Lizeth Timmons MD,  Benson Mcpherson MD, Duane Campanile, MD, Jony Rodriguez MD, Ronald Reyes MD, Mitchell Sandoval DO, Ana M Morillo MD,  Nusrat Bravo MD, Ezra Chahal MD, Parrish Ferreira, CNP,  Puneet Flores, CNP,, Isaias Fermin, CNP,  Sarah Reyes, [facility], Inge Lloyd, CNP, Pritesh Figueroa, CNP, Bharati Cabral, CNP, Bridget Joel, CNP, Tripp Pinto, CNP, Devonte Hernandez, CNP, Edwardo Peterson, [facility], Ladarius Hess, CNP, Irving Tipton, [address] [place] [address]    Progress Note    10/5/2023    11:44 AM    Name:   Tara Sutotn  MRN:     [id number]     Acct:      [de-identified]   Room:   [address] [place] [address] Day:  13  Admit Date:  9/22/2023 12:23 PM    PCP:   No primary care provider on file. Code Status:  DNR-CC    Subjective:     Patient was seen and examined at bedside this AM. He is resting comfortably. Awaiting placement. Medications:      Allergies:  No Known Allergies    Current Meds:   Scheduled Meds:    aspirin  81 mg Oral Daily    pravastatin  40 mg Oral Daily    tamsulosin  0.4 mg Oral Daily    mirtazapine  15 mg Oral Nightly    QUEtiapine  50 mg Oral Nightly    sodium chloride flush  5-40 mL IntraVENous 2 times per day    heparin (porcine)  5,000 Units SubCUTAneous 3 times per day     Continuous Infusions:    sodium chloride       PRN Meds: potassium chloride **OR** potassium alternative oral replacement **OR** potassium chloride, sodium chloride flush, sodium chloride, ondansetron **OR** ondansetron,

## 2023-10-05 NOTE — PROGRESS NOTES
Physical Therapy  Facility/Department: [facility]  Physical Therapy Daily Documentation Note  Name: Akua Crane  : 1934  MRN: [id number]  Date of Service: 10/5/2023    Discharge Recommendations:  Patient would benefit from continued therapy after discharge   PT Equipment Recommendations  Equipment Needed: No      Patient Diagnosis(es): The primary encounter diagnosis was Acute urinary retention. Diagnoses of Acute renal failure, unspecified acute renal failure type ([address]) and Urinary tract infection without hematuria, site unspecified were also pertinent to this visit. Past Medical History:  has a past medical history of Arthritis, Arthropathy, unspecified, site unspecified, Coronary atherosclerosis of unspecified type of vessel, native or graft, Other and unspecified hyperlipidemia, and Unspecified essential hypertension. Past Surgical History:  has a past surgical history that includes Knee arthroscopy; Coronary angioplasty with stent; Total hip arthroplasty; and Total shoulder arthroplasty (Right). Assessment   Body Structures, Functions, Activity Limitations Requiring Skilled Therapeutic Intervention: Decreased functional mobility ; Decreased safe awareness;Decreased balance;Decreased posture;Decreased cognition  Assessment: Noted less confusion today with gait but continued difficulty following verbal cues for exercises. Pt required Kyree for transfers &  min assist for 80ft with rolling walker. Pt requires 24 hour assist/supervision and would benefit from continued PT for balance, safety and functional mobility training while in the hospital and at discharge.   Therapy Prognosis: Good  Requires PT Follow-Up: Yes  Activity Tolerance  Activity Tolerance: Patient tolerated treatment well;Treatment limited secondary to decreased cognition     Plan   Physcial Therapy Plan  General Plan: 2-3 times per week  Current Treatment Recommendations: Balance training, Functional mobility training,

## 2023-10-05 NOTE — PROGRESS NOTES
Current  Protein (g/day): 119-159 g/day (1.5-2.0 g/kg IBW)  Method Used for Fluid Requirements: 1 ml/kcal  Fluid (ml/day): 9302-5856 ml    Nutrition Diagnosis:   Predicted inadequate energy intake related to early satiety as evidenced by poor intake prior to admission (pt refusing some meals at times)    Nutrition Interventions:   Food and/or Nutrient Delivery: Continue Current Diet, Continue Oral Nutrition Supplement  Nutrition Education/Counseling: No recommendation at this time  Coordination of Nutrition Care: Continue to monitor while inpatient, Interdisciplinary Rounds    Goals:  Previous Goal Met: Progressing toward Goal(s)  Goals: PO intake 50% or greater, prior to discharge    Nutrition Monitoring and Evaluation:   Behavioral-Environmental Outcomes: None Identified  Food/Nutrient Intake Outcomes: Food and Nutrient Intake, Supplement Intake  Physical Signs/Symptoms Outcomes: Biochemical Data, Nutrition Focused Physical Findings, Skin, Weight, Fluid Status or Edema    Discharge Planning:     Too soon to determine     Sullivan January, MEGAN, RDN, LD  Registered [address] [place] [address]  [phone]

## 2023-10-05 NOTE — PROGRESS NOTES
Occupational Therapy  Facility/Department: [facility] [address] [facility]  Occupational Therapy Daily Treatment Note      Name: Brittani Major  : 1934  MRN: [id number]  Date of Service: 10/5/2023    Discharge Recommendations:  24 hour supervision or assist, Patient would benefit from continued therapy after discharge  OT Equipment Recommendations  Equipment Needed: No     Patient Diagnosis(es): The primary encounter diagnosis was Acute urinary retention. Diagnoses of Acute renal failure, unspecified acute renal failure type ([address]) and Urinary tract infection without hematuria, site unspecified were also pertinent to this visit. Past Medical History:  has a past medical history of Arthritis, Arthropathy, unspecified, site unspecified, Coronary atherosclerosis of unspecified type of vessel, native or graft, Other and unspecified hyperlipidemia, and Unspecified essential hypertension. Past Surgical History:  has a past surgical history that includes Knee arthroscopy; Coronary angioplasty with stent; Total hip arthroplasty; and Total shoulder arthroplasty (Right). Assessment   Performance deficits / Impairments: Decreased functional mobility ; Decreased ADL status; Decreased strength;Decreased safe awareness;Decreased cognition;Decreased endurance;Decreased balance  Assessment: Pt is motivated to participate in OT POC and regain functional independence. At this time, Pt has ongoing functional deficits and impairments (as listed above). Pt will benefit from continued participation in [facility] in order to continue to improve Pts participation in functional tasks, improve // regain independence with self-care and daily tasks, and return to prior living situation. For safety at TN, Pt will benefit from 24 hour Supervision and Assist at Discharge.   Prognosis: Fair  Decision Making: Medium Complexity  Activity Tolerance  Activity Tolerance: Patient limited by fatigue;Treatment limited secondary to decreased (standing with Mod Assist); Pants and Brief management (standing with Max assist). Additional Comments: Mod Cues safe & accurate technique // integration of balance & energy conservation techniques. Activity Tolerance  Activity Tolerance: Patient tolerated treatment well;Treatment limited secondary to decreased cognition    Vision  Vision:  (Unable to assess vision due to decreased cognition; pt does mention having glasses 1x during session)  Hearing  Hearing: Exceptions to [facility]  Hearing Exceptions: Hard of hearing/hearing concerns; Left hearing aid    Cognition  Overall Cognitive Status: Exceptions  Arousal/Alertness: Delayed responses to stimuli  Following Commands: Follows one step commands with increased time; Follows one step commands with repetition  Attention Span: Attends with cues to redirect  Memory: Decreased long term memory;Decreased recall of recent events;Decreased recall of precautions;Decreased short term memory  Safety Judgement: Decreased awareness of need for assistance;Decreased awareness of need for safety  Problem Solving: Assistance required to identify errors made;Assistance required to generate solutions;Assistance required to implement solutions;Decreased awareness of errors;Assistance required to correct errors made  Insights: Decreased awareness of deficits  Initiation: Requires cues for some  Sequencing: Requires cues for some  Cognition Comment: Telesitter in room  Orientation  Overall Orientation Status: Impaired  Orientation Level: Oriented to person    Education Given To: Patient  Education Provided: Transfer Training;Equipment;ADL Adaptive Strategies; Energy Conservation; Fall Prevention Strategies;Precautions;Plan of Care  Education Method: Demonstration;Verbal  Barriers to Learning: Cognition; Hearing  Education Outcome: Continued education needed; Unable to demonstrate understanding;Verbalized understanding      AM-PAC Score  AM-PAC Inpatient Daily Activity Raw Score: 13 (10/05/23

## 2023-10-05 NOTE — PLAN OF CARE
Problem: Discharge Planning  Goal: Discharge to home or other facility with appropriate resources  Outcome: Progressing   Patient actively participates in ADLs and decision making regarding plan of care     Problem: Pain  Goal: Verbalizes/displays adequate comfort level or baseline comfort level  Outcome: Progressing   No new signs/symptoms of pain noted, pain rating < 3 on scale 0-10, pain controlled with medication/ repositioning     Problem: Skin/Tissue Integrity  Goal: Absence of new skin breakdown  Description: 1. Monitor for areas of redness and/or skin breakdown  2. Assess vascular access sites hourly  3. Every 4-6 hours minimum:  Change oxygen saturation probe site  4. Every 4-6 hours:  If on nasal continuous positive airway pressure, respiratory therapy assess nares and determine need for appliance change or resting period.   Outcome: Progressing   No new skin breakdown noted, no new signs/symptoms of infection, continue to monitor lab work including WBC, and medications administered per physicians orders     Problem: Safety - Adult  Goal: Free from fall injury  Outcome: Progressing   No falls/injuries this shift, bed in lowest position, breaks on, bed alarm on, call light within reach, side rails up X2

## 2023-10-05 NOTE — PROGRESS NOTES
Pt resting in bed comfortably at this time. Pt has been asleep the majority of the shift. Bed alarm on and telesitter remains in patient room.

## 2023-10-06 VITALS
SYSTOLIC BLOOD PRESSURE: 106 MMHG | BODY MASS INDEX: 23.9 KG/M2 | HEIGHT: 73 IN | DIASTOLIC BLOOD PRESSURE: 61 MMHG | HEART RATE: 74 BPM | TEMPERATURE: 98.1 F | WEIGHT: 180.34 LBS | RESPIRATION RATE: 15 BRPM | OXYGEN SATURATION: 97 %

## 2023-10-06 LAB
GLUCOSE BLD-MCNC: 124 MG/DL (ref 75–110)
GLUCOSE BLD-MCNC: 98 MG/DL (ref 75–110)

## 2023-10-06 PROCEDURE — 99238 HOSP IP/OBS DSCHRG MGMT 30/<: CPT | Performed by: STUDENT IN AN ORGANIZED HEALTH CARE EDUCATION/TRAINING PROGRAM

## 2023-10-06 PROCEDURE — 82947 ASSAY GLUCOSE BLOOD QUANT: CPT

## 2023-10-06 PROCEDURE — 6360000002 HC RX W HCPCS: Performed by: STUDENT IN AN ORGANIZED HEALTH CARE EDUCATION/TRAINING PROGRAM

## 2023-10-06 PROCEDURE — 97116 GAIT TRAINING THERAPY: CPT

## 2023-10-06 PROCEDURE — 97110 THERAPEUTIC EXERCISES: CPT

## 2023-10-06 PROCEDURE — 6370000000 HC RX 637 (ALT 250 FOR IP): Performed by: STUDENT IN AN ORGANIZED HEALTH CARE EDUCATION/TRAINING PROGRAM

## 2023-10-06 RX ADMIN — TAMSULOSIN HYDROCHLORIDE 0.4 MG: 0.4 CAPSULE ORAL at 09:06

## 2023-10-06 RX ADMIN — HEPARIN SODIUM 5000 UNITS: 5000 INJECTION INTRAVENOUS; SUBCUTANEOUS at 13:31

## 2023-10-06 RX ADMIN — ASPIRIN 81 MG: 81 TABLET, COATED ORAL at 09:05

## 2023-10-06 RX ADMIN — PRAVASTATIN SODIUM 40 MG: 20 TABLET ORAL at 09:05

## 2023-10-06 NOTE — CARE COORDINATION
Transitional Planning    Contacted [facility] @ [address] [place] [address]. Explained that pt has been participating in therapy, has applied to medicaid. Requesting if they would consider taking patient as skilled until medicaid kicked in. [facility] states she will talk to administrators and follow up. Spoke with [facility] again, they have agreed to take patient today. Requesting writer send [employer]. Application sent.      Transport ETA [id number]    # for Report [phone]

## 2023-10-06 NOTE — PROGRESS NOTES
[facility]  Office: [phone]  Dolores Jim DO, Terri Irwin DO, Nena Stratton DO, Marielle Jim MD, Hitesh Majano MD, Tristin King MD, Anaid Garcia MD,  Sudheer Narayanan MD, Sonal Nash MD, Gretel Rosales DO, Rajinder Cloud MD,  Vanesa Mcneil MD, Asha Thurman MD, Ines Riley DO, Luis Armando Llanes MD,  Chad Ignacio MD, Tacho Booker MD, Keanu Mendez MD, Lizeth Timmons MD,  Benson Mcpherson MD, Duane Campanile, MD, Jony Rodriguez MD, Ronald Reyes MD, Mitchell Sandoval DO, Ana M Morillo MD,  Nusrat Bravo MD, Ezra Chahal MD, Parrish Ferreira, CNP,  Puneet Flores, CNP,, Isaias Fermin, CNP,  Sarah Reyes, [facility], Inge Lloyd, CNP, Pritesh Figueroa, CNP, Bharati Cabral, CNP, Bridget Joel, CNP, Tripp Pinto, CNP, Devonte Hernandez, CNP, Edwardo Peterson, [facility], Ladarius Hess, CNP, Irving Tipton, [address] [place] [address]    Progress Note    10/6/2023    9:32 AM    Name:   Tara Sutton  MRN:     [id number]     Acct:      [de-identified]   Room:   [address] [place] [address] Day:  15  Admit Date:  9/22/2023 12:23 PM    PCP:   No primary care provider on file. Code Status:  DNR-CC    Subjective:     Patient was seen and examined at bedside this AM. There were no acute events overnight. Awaiting placement. Medications:      Allergies:  No Known Allergies    Current Meds:   Scheduled Meds:    aspirin  81 mg Oral Daily    pravastatin  40 mg Oral Daily    tamsulosin  0.4 mg Oral Daily    mirtazapine  15 mg Oral Nightly    QUEtiapine  50 mg Oral Nightly    sodium chloride flush  5-40 mL IntraVENous 2 times per day    heparin (porcine)  5,000 Units SubCUTAneous 3 times per day     Continuous Infusions:    sodium chloride       PRN Meds: potassium chloride **OR** potassium alternative oral replacement **OR** potassium chloride, sodium chloride flush, sodium chloride, ondansetron **OR** ondansetron, nontender, nondistended, normal bowel sounds, no masses, hepatomegaly, splenomegaly  Extremities:  no edema, redness, tenderness in the calves  Skin:  no gross lesions, rashes, induration    Assessment:     Hospital Problems             Last Modified POA    * (Principal) Acute renal failure (ARF) ([address]) 9/22/2023 Yes    HTN (hypertension) (Chronic) 9/22/2023 Yes    Hyperlipidemia (Chronic) 9/22/2023 Yes    Acute urinary retention 9/29/2023 Yes    Failure to thrive in adult 9/26/2023 Yes    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

## 2023-10-06 NOTE — PLAN OF CARE
Problem: Discharge Planning  Goal: Discharge to home or other facility with appropriate resources  10/6/2023 0007 by Deny Hooper RN  Outcome: Progressing  Flowsheets (Taken 10/5/2023 2000)  Discharge to home or other facility with appropriate resources: Identify barriers to discharge with patient and caregiver  10/5/2023 1439 by Марина Hale RN  Outcome: Progressing     Problem: Pain  Goal: Verbalizes/displays adequate comfort level or baseline comfort level  10/6/2023 0007 by Deny Hooper RN  Outcome: Progressing  Flowsheets (Taken 10/5/2023 2000)  Verbalizes/displays adequate comfort level or baseline comfort level: Encourage patient to monitor pain and request assistance  10/5/2023 1439 by Марина Hale RN  Outcome: Progressing     Problem: Skin/Tissue Integrity  Goal: Absence of new skin breakdown  Description: 1. Monitor for areas of redness and/or skin breakdown  2. Assess vascular access sites hourly  3. Every 4-6 hours minimum:  Change oxygen saturation probe site  4. Every 4-6 hours:  If on nasal continuous positive airway pressure, respiratory therapy assess nares and determine need for appliance change or resting period.   10/6/2023 0007 by Deny Hooper RN  Outcome: Progressing  10/5/2023 1439 by Марина Hale RN  Outcome: Progressing     Problem: Safety - Adult  Goal: Free from fall injury  10/6/2023 0007 by Deny Hooper RN  Outcome: Progressing  10/5/2023 1439 by Марина Hale RN  Outcome: Progressing     Problem: ABCDS Injury Assessment  Goal: Absence of physical injury  10/6/2023 0007 by Deny Hooper RN  Outcome: Progressing  10/5/2023 1439 by Марина Hale RN  Outcome: Progressing     Problem: Nutrition Deficit:  Goal: Optimize nutritional status  10/6/2023 0007 by Deny Hooper RN  Outcome: Progressing  10/5/2023 1439 by Марина Hale RN  Outcome: Progressing  Flowsheets (Taken 10/5/2023 1051 by Iglesia Lock RD)  Nutrient intake appropriate for improving, restoring,

## 2023-10-06 NOTE — PROGRESS NOTES
Report called and given to Brian Bahena at [facility]. All questions answered and call back number provided.

## 2023-10-06 NOTE — PROGRESS NOTES
Physical Therapy  Facility/Department: [facility]  Physical Therapy Daily Documentation Note    Name: Abner Desouza  : 1934  MRN: [id number]  Date of Service: 10/6/2023    Discharge Recommendations:  Patient would benefit from continued therapy after discharge   PT Equipment Recommendations  Equipment Needed: No      Patient Diagnosis(es): The primary encounter diagnosis was Acute urinary retention. Diagnoses of Acute renal failure, unspecified acute renal failure type ([address]) and Urinary tract infection without hematuria, site unspecified were also pertinent to this visit. Past Medical History:  has a past medical history of Arthritis, Arthropathy, unspecified, site unspecified, Coronary atherosclerosis of unspecified type of vessel, native or graft, Other and unspecified hyperlipidemia, and Unspecified essential hypertension. Past Surgical History:  has a past surgical history that includes Knee arthroscopy; Coronary angioplasty with stent; Total hip arthroplasty; and Total shoulder arthroplasty (Right). Assessment   Body Structures, Functions, Activity Limitations Requiring Skilled Therapeutic Intervention: Decreased functional mobility ; Decreased safe awareness;Decreased balance;Decreased posture;Decreased cognition  Assessment: Continued less confusion today with gait and better able to follow verbal/tactile cues for exercises. Pt required mod A bed mobility, Kyree for transfers & min assist for 120ft with rolling walker. Pt requires 24 hour assist/supervision and would benefit from continued PT for balance, safety and functional mobility training while in the hospital and at discharge.   Therapy Prognosis: Good  Requires PT Follow-Up: Yes  Activity Tolerance  Activity Tolerance: Patient tolerated treatment well;Treatment limited secondary to decreased cognition  Activity Tolerance Comments: improved ability to follow ROM LE ex today     Plan   Physcial Therapy Plan  General Plan: 2-3 times per week  Current Treatment Recommendations: Balance training, Functional mobility training, Transfer training, Gait training, Therapeutic activities, Cognitive reorientation, Endurance training, Safety education & training  Safety Devices  Type of Devices: Call light within reach, Nurse notified, Gait belt, Patient at risk for falls, Telesitter in use, Left in chair, Chair alarm in place (son in room)  Restraints  Restraints Initially in Place: No     Restrictions  Restrictions/Precautions  Restrictions/Precautions: Fall Risk, Bed Alarm, Other (comment) (telesitter)  Required Braces or Orthoses?: No  Position Activity Restriction  Other position/activity restrictions: Up with Assist  //  Confusion     Subjective   General  Patient assessed for rehabilitation services?: Yes  Response To Previous Treatment: Patient with no complaints from previous session. Family / Caregiver Present: Yes (son)  Follows Commands: Impaired  Subjective  Subjective: RN agreeable to therapy. Pt in bed upon arrival with son in room. Pt did not report any pain during treatment. Pt agreeable to getting out of bed with therapy but unable to process how. Social/Functional History  Social/Functional History  Type of Home: Facility  Additional Comments: Pt poor historian due to dementia and significant confusion. Information obtained per medical record. Pt admitted from SNF following recent hospitalization, prior to hospitalization pt living with son and dtr in law. Vision/Hearing       Cognition   Orientation  Overall Orientation Status: Impaired  Orientation Level: Oriented to person  Cognition  Overall Cognitive Status: Exceptions  Arousal/Alertness: Delayed responses to stimuli  Following Commands: Follows one step commands with increased time; Follows one step commands with repetition  Attention Span: Attends with cues to redirect  Memory: Decreased long term memory;Decreased recall of recent events;Decreased recall of

## 2023-10-06 NOTE — PLAN OF CARE
Problem: Discharge Planning  Goal: Discharge to home or other facility with appropriate resources  10/6/2023 1355 by Tony Ibrahim RN  Outcome: Progressing  Flowsheets (Taken 10/6/2023 0800)  Discharge to home or other facility with appropriate resources: Identify barriers to discharge with patient and caregiver  10/6/2023 0007 by Jose Hernandez RN  Outcome: Progressing  [address] (Taken 10/5/2023 2000)  Discharge to home or other facility with appropriate resources: Identify barriers to discharge with patient and caregiver     Problem: Pain  Goal: Verbalizes/displays adequate comfort level or baseline comfort level  10/6/2023 1355 by Tony Ibrahim RN  Outcome: Progressing  10/6/2023 0007 by Jose Hernandez RN  Outcome: Progressing  Flowsheets (Taken 10/5/2023 2000)  Verbalizes/displays adequate comfort level or baseline comfort level: Encourage patient to monitor pain and request assistance     Problem: Skin/Tissue Integrity  Goal: Absence of new skin breakdown  Description: 1. Monitor for areas of redness and/or skin breakdown  2. Assess vascular access sites hourly  3. Every 4-6 hours minimum:  Change oxygen saturation probe site  4. Every 4-6 hours:  If on nasal continuous positive airway pressure, respiratory therapy assess nares and determine need for appliance change or resting period.   10/6/2023 1355 by Tony Ibrahim RN  Outcome: Progressing  10/6/2023 0007 by Jose Hernandez RN  Outcome: Progressing     Problem: Safety - Adult  Goal: Free from fall injury  10/6/2023 1355 by Tony Ibrahim RN  Outcome: Progressing  10/6/2023 0007 by Jose Hernandez RN  Outcome: Progressing     Problem: ABCDS Injury Assessment  Goal: Absence of physical injury  10/6/2023 1355 by Tony Ibrahim RN  Outcome: Progressing  10/6/2023 0007 by Jose Hernandez RN  Outcome: Progressing     Problem: Nutrition Deficit:  Goal: Optimize nutritional status  10/6/2023 1355 by Tony Ibrahim RN  Outcome: Progressing  10/6/2023 0007 by Deny Hooper RN  Outcome: Progressing

## 2023-10-06 NOTE — DISCHARGE SUMMARY
[facility]  Office: [phone]  Akilah Salguero DO, Daysi Alcocer DO, Janny Stratton DO, Marianela Norwood MD, Shila Meza MD, Isac Garcia MD, Linda Veronica MD,  Faith Ace MD, Alisa Singh MD, Yaa Bhatia DO, Sara Weller MD,  Miranda Fuchs MD, Venessa Mcknight MD, Naty Arrieta DO, Bianca Becker MD,  Kendra Nichole MD, Clari Negro MD, Marielle Nova MD, Abby Dia MD,  Magalys Pickett MD, Elias Calvillo MD, Sherron Marr MD, Moon Fontaine MD, Todd Clemons DO, Leonardo Alvarado MD,  Althea Muhammad MD, Tess Mcelroy MD, Joy Capps, CNP,  Renan Chavarria, CNP,, Nav Vargas, CNP,  Kevin Gabriel, DNP, Jonathan Polo, CNP, Bg Santiago, CNP, Jennifer Mendenhall, CNP, Isac Cota, CNP, Raven Wright, CNP, Orly aCt, CNP, Betty Avery, CNS, Chary Cunningham, CNP, Dionicio Santos, CNP         [facility]    Discharge Summary     Patient ID: Delfino Holland  :  1934   MRN: [id number]     ACCOUNT:  [de-identified]   Patient's PCP: No primary care provider on file. Admit Date: 2023   Discharge Date: 10/6/2023  Length of Stay: 14  Code Status:  DNR-CC  Admitting Physician: Venessa Mcknight MD  Discharge Physician: Venessa Mcknight MD     Active Discharge Diagnoses:     Hospital Problem Lists:  Principal Problem:    Acute renal failure (ARF) ([address])  Active Problems:    HTN (hypertension)    Hyperlipidemia    Acute urinary retention    Failure to thrive in adult    BPH with obstruction/lower urinary tract symptoms  Resolved Problems:    * No resolved hospital problems.  *      Admission Condition:  critical     Discharged Condition: fair    Hospital Stay:     Hospital Course:  Delfino Holland is an 51-year-old male with a past medical history of hypertension and hyperlipidemia who presented to the emergency department on 2023 after being found to have an elevated known as: PLAVIX  TAKE 1 TABLET BY MOUTH ONCE DAILY     hydroCHLOROthiazide 12.5 MG tablet  Commonly known as: HYDRODIURIL  TAKE 2 TABLETS BY MOUTH DAILY     losartan 50 MG tablet  Commonly known as: COZAAR  TAKE 2 TABLETS BY MOUTH DAILY     pravastatin 40 MG tablet  Commonly known as: PRAVACHOL  TAKE 1 TABLET BY MOUTH ONCE DAILY AS DIRECTED     QUEtiapine 50 MG tablet  Commonly known as: SEROQUEL  Take 1 tablet by mouth nightly               Where to Get Your Medications        Information about where to get these medications is not yet available    Ask your nurse or doctor about these medications  tamsulosin 0.4 MG capsule         Time spent on discharge is 20 mins in patient examination, evaluation, counseling as well as medication reconciliation, prescriptions for required medications, discharge plan and follow up. Electronically signed by   Moe Medina MD  10/6/2023  2:42 PM      Thank you Dr. Chico Adair primary care provider on file. for the opportunity to be involved in this patient's care.

## 2023-10-06 NOTE — PROGRESS NOTES
Occupational Therapy  [facility]  Occupational Therapy Daily Treatment Note    Name: Akua Crane  : 1934  MRN: [id number]  Date of Service: 10/6/2023    Chief Complaint   Patient presents with    Abnormal Labs     Discharge Recommendations:  24 hour supervision or assist, Patient would benefit from continued therapy after discharge    Patient Diagnosis(es): The primary encounter diagnosis was Acute urinary retention. Diagnoses of Acute renal failure, unspecified acute renal failure type ([address]) and Urinary tract infection without hematuria, site unspecified were also pertinent to this visit. Past Medical History:  has a past medical history of Arthritis, Arthropathy, unspecified, site unspecified, Coronary atherosclerosis of unspecified type of vessel, native or graft, Other and unspecified hyperlipidemia, and Unspecified essential hypertension. Past Surgical History:  has a past surgical history that includes Knee arthroscopy; Coronary angioplasty with stent; Total hip arthroplasty; and Total shoulder arthroplasty (Right). Assessment   Performance deficits / Impairments: Decreased functional mobility ; Decreased ADL status; Decreased strength;Decreased safe awareness;Decreased cognition;Decreased endurance;Decreased balance    Assessment: Pt seen for OT tx this PM. Pt agreeable to BUE HEP while seated in recliner however declined mobility out of chair. Pt completed 2 sets of 10-12 repetitions of bicep cursl, chest press and dynamic passess (with non-weighted ball) to support improved strength and endurance needed for ADLs and ADL transfers. Good tolerance of exercises however pt requires MAX vc's to maintain task and complete exercises with proper form. Pt to benefit from continued therapy services during hospitalization and following discharge to address functional deficits listed above. Pt will require 24-hour SUP/assist upon discharge.     Prognosis: Fair  REQUIRES ADL tasks with mod IND including setup and use of AE/DME PRN  Short Term Goal 3: Pt will perform toileting/LB ADL tasks with CGA including setup and use of AE/DME PRN  Short Term Goal 4: Pt will perform functional transfers/functional mobility with CGA using least restrictive AD  Short Term Goal 5: Pt will demo 8+ minutes standing tolerance with use of least restrictive AD for increased engagement in functional tasks    Therapy Time   Individual Concurrent Group Co-treatment   Time In 1511         Time Out 1535         Minutes 24         Timed Code Treatment Minutes: [id number] [facility] Vicente Clause

## 2023-10-06 NOTE — CARE COORDINATION
SW following for guardianship resources and assisting with support. Expert eval completed by physician. Copy placed in chart. Provided [facility] guardianship application and expert eval for patient's family to  during next visit with patient. Will notify daughter in law of information.